# Patient Record
Sex: FEMALE | Race: WHITE | NOT HISPANIC OR LATINO | Employment: FULL TIME | URBAN - METROPOLITAN AREA
[De-identification: names, ages, dates, MRNs, and addresses within clinical notes are randomized per-mention and may not be internally consistent; named-entity substitution may affect disease eponyms.]

---

## 2021-05-20 DIAGNOSIS — O09.522 MULTIGRAVIDA OF ADVANCED MATERNAL AGE IN SECOND TRIMESTER: Primary | ICD-10-CM

## 2021-05-20 DIAGNOSIS — Z36.89 ENCOUNTER FOR FETAL ANATOMIC SURVEY: ICD-10-CM

## 2021-07-21 ENCOUNTER — PROCEDURE VISIT (OUTPATIENT)
Dept: MATERNAL FETAL MEDICINE | Facility: CLINIC | Age: 41
End: 2021-07-21
Payer: OTHER GOVERNMENT

## 2021-07-21 VITALS
DIASTOLIC BLOOD PRESSURE: 79 MMHG | BODY MASS INDEX: 31.55 KG/M2 | HEIGHT: 67 IN | WEIGHT: 201 LBS | SYSTOLIC BLOOD PRESSURE: 130 MMHG

## 2021-07-21 DIAGNOSIS — Z36.89 ENCOUNTER FOR FETAL ANATOMIC SURVEY: ICD-10-CM

## 2021-07-21 DIAGNOSIS — O09.522 MULTIGRAVIDA OF ADVANCED MATERNAL AGE IN SECOND TRIMESTER: ICD-10-CM

## 2021-07-21 DIAGNOSIS — O09.522 MULTIGRAVIDA OF ADVANCED MATERNAL AGE IN SECOND TRIMESTER: Primary | ICD-10-CM

## 2021-07-21 DIAGNOSIS — Z36.89 ENCOUNTER FOR ULTRASOUND TO ASSESS FETAL GROWTH: ICD-10-CM

## 2021-07-21 PROCEDURE — 99204 OFFICE O/P NEW MOD 45 MIN: CPT | Mod: 25,,, | Performed by: OBSTETRICS & GYNECOLOGY

## 2021-07-21 PROCEDURE — 99204 PR OFFICE/OUTPT VISIT, NEW, LEVL IV, 45-59 MIN: ICD-10-PCS | Mod: 25,,, | Performed by: OBSTETRICS & GYNECOLOGY

## 2021-07-21 PROCEDURE — 76811 PR US, OB FETAL EVAL & EXAM, TRANSABDOM,FIRST GESTATION: ICD-10-PCS | Mod: ,,, | Performed by: OBSTETRICS & GYNECOLOGY

## 2021-07-21 PROCEDURE — 76811 OB US DETAILED SNGL FETUS: CPT | Mod: ,,, | Performed by: OBSTETRICS & GYNECOLOGY

## 2021-09-08 ENCOUNTER — PROCEDURE VISIT (OUTPATIENT)
Dept: MATERNAL FETAL MEDICINE | Facility: CLINIC | Age: 41
End: 2021-09-08
Payer: OTHER GOVERNMENT

## 2021-09-08 VITALS
SYSTOLIC BLOOD PRESSURE: 132 MMHG | BODY MASS INDEX: 31.75 KG/M2 | WEIGHT: 202.69 LBS | DIASTOLIC BLOOD PRESSURE: 81 MMHG

## 2021-09-08 DIAGNOSIS — O09.522 MULTIGRAVIDA OF ADVANCED MATERNAL AGE IN SECOND TRIMESTER: ICD-10-CM

## 2021-09-08 DIAGNOSIS — Z36.89 ENCOUNTER FOR ULTRASOUND TO ASSESS FETAL GROWTH: ICD-10-CM

## 2021-09-08 DIAGNOSIS — O35.00X0 FETAL CNS MALFORMATION IN PREGNANCY, SINGLE GESTATION: ICD-10-CM

## 2021-09-08 PROCEDURE — 99213 OFFICE O/P EST LOW 20 MIN: CPT | Mod: 25,,, | Performed by: OBSTETRICS & GYNECOLOGY

## 2021-09-08 PROCEDURE — 76816 OB US FOLLOW-UP PER FETUS: CPT | Mod: 26,,, | Performed by: OBSTETRICS & GYNECOLOGY

## 2021-09-08 PROCEDURE — 76816 PR  US,PREGNANT UTERUS,F/U,TRANSABD APP: ICD-10-PCS | Mod: 26,,, | Performed by: OBSTETRICS & GYNECOLOGY

## 2021-09-08 PROCEDURE — 99213 PR OFFICE/OUTPT VISIT, EST, LEVL III, 20-29 MIN: ICD-10-PCS | Mod: 25,,, | Performed by: OBSTETRICS & GYNECOLOGY

## 2021-09-13 DIAGNOSIS — O35.00X0 FETAL CNS MALFORMATION IN PREGNANCY, SINGLE GESTATION: Primary | ICD-10-CM

## 2021-09-14 ENCOUNTER — TELEPHONE (OUTPATIENT)
Dept: MATERNAL FETAL MEDICINE | Facility: CLINIC | Age: 41
End: 2021-09-14

## 2021-09-16 ENCOUNTER — TELEPHONE (OUTPATIENT)
Dept: MATERNAL FETAL MEDICINE | Facility: CLINIC | Age: 41
End: 2021-09-16

## 2021-09-17 ENCOUNTER — TELEPHONE (OUTPATIENT)
Dept: MATERNAL FETAL MEDICINE | Facility: CLINIC | Age: 41
End: 2021-09-17

## 2021-09-27 ENCOUNTER — HOSPITAL ENCOUNTER (OUTPATIENT)
Dept: RADIOLOGY | Facility: OTHER | Age: 41
Discharge: HOME | End: 2021-09-27
Attending: OBSTETRICS & GYNECOLOGY
Payer: OTHER GOVERNMENT

## 2021-09-27 DIAGNOSIS — O35.00X0 FETAL CNS MALFORMATION IN PREGNANCY, SINGLE GESTATION: ICD-10-CM

## 2021-10-20 ENCOUNTER — OFFICE VISIT (OUTPATIENT)
Dept: MATERNAL FETAL MEDICINE | Facility: CLINIC | Age: 41
End: 2021-10-20
Payer: OTHER GOVERNMENT

## 2021-10-20 VITALS — WEIGHT: 211 LBS | SYSTOLIC BLOOD PRESSURE: 135 MMHG | BODY MASS INDEX: 33.05 KG/M2 | DIASTOLIC BLOOD PRESSURE: 81 MMHG

## 2021-10-20 DIAGNOSIS — O09.522 MULTIGRAVIDA OF ADVANCED MATERNAL AGE IN SECOND TRIMESTER: ICD-10-CM

## 2021-10-20 DIAGNOSIS — Z36.89 ENCOUNTER FOR ULTRASOUND TO ASSESS FETAL GROWTH: ICD-10-CM

## 2021-10-20 PROCEDURE — 99213 OFFICE O/P EST LOW 20 MIN: CPT | Mod: 25,,, | Performed by: OBSTETRICS & GYNECOLOGY

## 2021-10-20 PROCEDURE — 76816 OB US FOLLOW-UP PER FETUS: CPT | Mod: ,,, | Performed by: OBSTETRICS & GYNECOLOGY

## 2021-10-20 PROCEDURE — 99213 PR OFFICE/OUTPT VISIT, EST, LEVL III, 20-29 MIN: ICD-10-PCS | Mod: 25,,, | Performed by: OBSTETRICS & GYNECOLOGY

## 2021-10-20 PROCEDURE — 76816 PR  US,PREGNANT UTERUS,F/U,TRANSABD APP: ICD-10-PCS | Mod: ,,, | Performed by: OBSTETRICS & GYNECOLOGY

## 2021-11-17 ENCOUNTER — OFFICE VISIT (OUTPATIENT)
Dept: MATERNAL FETAL MEDICINE | Facility: CLINIC | Age: 41
End: 2021-11-17
Payer: OTHER GOVERNMENT

## 2021-11-17 VITALS
DIASTOLIC BLOOD PRESSURE: 88 MMHG | WEIGHT: 214.63 LBS | SYSTOLIC BLOOD PRESSURE: 141 MMHG | BODY MASS INDEX: 33.61 KG/M2

## 2021-11-17 DIAGNOSIS — Z36.89 ENCOUNTER FOR ULTRASOUND TO ASSESS FETAL GROWTH: ICD-10-CM

## 2021-11-17 PROCEDURE — 76819 PR US, OB, FETAL BIOPHYSICAL, W/O NST: ICD-10-PCS | Mod: S$GLB,,, | Performed by: OBSTETRICS & GYNECOLOGY

## 2021-11-17 PROCEDURE — 99499 NO LOS: ICD-10-PCS | Mod: S$GLB,,, | Performed by: OBSTETRICS & GYNECOLOGY

## 2021-11-17 PROCEDURE — 76816 PR  US,PREGNANT UTERUS,F/U,TRANSABD APP: ICD-10-PCS | Mod: S$GLB,,, | Performed by: OBSTETRICS & GYNECOLOGY

## 2021-11-17 PROCEDURE — 76816 OB US FOLLOW-UP PER FETUS: CPT | Mod: S$GLB,,, | Performed by: OBSTETRICS & GYNECOLOGY

## 2021-11-17 PROCEDURE — 99499 UNLISTED E&M SERVICE: CPT | Mod: S$GLB,,, | Performed by: OBSTETRICS & GYNECOLOGY

## 2021-11-17 PROCEDURE — 76819 FETAL BIOPHYS PROFIL W/O NST: CPT | Mod: S$GLB,,, | Performed by: OBSTETRICS & GYNECOLOGY

## 2022-09-08 DIAGNOSIS — Z82.79 FAMILY HISTORY OF CONGENITAL OR GENETIC CONDITION: Primary | ICD-10-CM

## 2022-09-09 ENCOUNTER — LAB VISIT (OUTPATIENT)
Dept: LAB | Facility: HOSPITAL | Age: 42
End: 2022-09-09
Attending: MEDICAL GENETICS
Payer: OTHER GOVERNMENT

## 2022-09-09 DIAGNOSIS — Z82.79 FAMILY HISTORY OF CONGENITAL OR GENETIC CONDITION: ICD-10-CM

## 2022-09-09 PROCEDURE — 36415 COLL VENOUS BLD VENIPUNCTURE: CPT | Performed by: MEDICAL GENETICS

## 2022-11-02 LAB
GENETIC COUNSELING?: YES
GENSO SPECIMEN TYPE: NORMAL
MISCELLANEOUS GENETIC TEST NAME: NORMAL
PARTENTAL OR SIBLING TESTING?: NO
REFERENCE LAB: NORMAL
TEST RESULT: NORMAL

## 2022-12-27 DIAGNOSIS — Z82.79 FAMILY HISTORY OF CONGENITAL OR GENETIC CONDITION: Primary | ICD-10-CM

## 2023-01-25 ENCOUNTER — OFFICE VISIT (OUTPATIENT)
Dept: PSYCHIATRY | Facility: CLINIC | Age: 43
End: 2023-01-25
Payer: OTHER GOVERNMENT

## 2023-01-25 DIAGNOSIS — F43.22 ADJUSTMENT DISORDER WITH ANXIETY: Primary | ICD-10-CM

## 2023-01-25 PROCEDURE — 90837 PR PSYCHOTHERAPY W/PATIENT, 60 MIN: ICD-10-PCS | Mod: S$GLB,,, | Performed by: STUDENT IN AN ORGANIZED HEALTH CARE EDUCATION/TRAINING PROGRAM

## 2023-01-25 PROCEDURE — 90837 PSYTX W PT 60 MINUTES: CPT | Mod: S$GLB,,, | Performed by: STUDENT IN AN ORGANIZED HEALTH CARE EDUCATION/TRAINING PROGRAM

## 2023-02-03 ENCOUNTER — TELEPHONE (OUTPATIENT)
Dept: PSYCHIATRY | Facility: CLINIC | Age: 43
End: 2023-02-03
Payer: OTHER GOVERNMENT

## 2023-02-10 ENCOUNTER — OFFICE VISIT (OUTPATIENT)
Dept: INTERNAL MEDICINE | Facility: CLINIC | Age: 43
End: 2023-02-10
Payer: OTHER GOVERNMENT

## 2023-02-10 ENCOUNTER — PATIENT MESSAGE (OUTPATIENT)
Dept: INTERNAL MEDICINE | Facility: CLINIC | Age: 43
End: 2023-02-10

## 2023-02-10 VITALS
SYSTOLIC BLOOD PRESSURE: 126 MMHG | HEART RATE: 60 BPM | OXYGEN SATURATION: 100 % | HEIGHT: 67 IN | DIASTOLIC BLOOD PRESSURE: 88 MMHG | WEIGHT: 220.88 LBS | BODY MASS INDEX: 34.67 KG/M2

## 2023-02-10 DIAGNOSIS — M72.2 PLANTAR FASCIITIS, BILATERAL: ICD-10-CM

## 2023-02-10 DIAGNOSIS — K21.9 GASTROESOPHAGEAL REFLUX DISEASE, UNSPECIFIED WHETHER ESOPHAGITIS PRESENT: ICD-10-CM

## 2023-02-10 DIAGNOSIS — R53.83 FATIGUE, UNSPECIFIED TYPE: ICD-10-CM

## 2023-02-10 DIAGNOSIS — Z11.59 ENCOUNTER FOR HEPATITIS C SCREENING TEST FOR LOW RISK PATIENT: ICD-10-CM

## 2023-02-10 DIAGNOSIS — Z11.4 SCREENING FOR HIV (HUMAN IMMUNODEFICIENCY VIRUS): ICD-10-CM

## 2023-02-10 DIAGNOSIS — Z00.00 HEALTH MAINTENANCE EXAMINATION: Primary | ICD-10-CM

## 2023-02-10 DIAGNOSIS — Z13.1 SCREENING FOR DIABETES MELLITUS: ICD-10-CM

## 2023-02-10 DIAGNOSIS — Z13.220 ENCOUNTER FOR SCREENING FOR LIPID DISORDER: ICD-10-CM

## 2023-02-10 DIAGNOSIS — F41.9 ANXIETY: ICD-10-CM

## 2023-02-10 PROCEDURE — 99203 OFFICE O/P NEW LOW 30 MIN: CPT | Mod: 25,S$GLB,, | Performed by: INTERNAL MEDICINE

## 2023-02-10 PROCEDURE — 99203 PR OFFICE/OUTPT VISIT, NEW, LEVL III, 30-44 MIN: ICD-10-PCS | Mod: 25,S$GLB,, | Performed by: INTERNAL MEDICINE

## 2023-02-10 PROCEDURE — 90715 TDAP VACCINE 7 YRS/> IM: CPT | Mod: S$GLB,,, | Performed by: INTERNAL MEDICINE

## 2023-02-10 PROCEDURE — 90471 FLU VACCINE (QUAD) GREATER THAN OR EQUAL TO 3YO PRESERVATIVE FREE IM: ICD-10-PCS | Mod: S$GLB,,, | Performed by: INTERNAL MEDICINE

## 2023-02-10 PROCEDURE — 99999 PR PBB SHADOW E&M-EST. PATIENT-LVL V: ICD-10-PCS | Mod: PBBFAC,,, | Performed by: INTERNAL MEDICINE

## 2023-02-10 PROCEDURE — 90472 IMMUNIZATION ADMIN EACH ADD: CPT | Mod: S$GLB,,, | Performed by: INTERNAL MEDICINE

## 2023-02-10 PROCEDURE — 90715 TDAP VACCINE GREATER THAN OR EQUAL TO 7YO IM: ICD-10-PCS | Mod: S$GLB,,, | Performed by: INTERNAL MEDICINE

## 2023-02-10 PROCEDURE — 90686 FLU VACCINE (QUAD) GREATER THAN OR EQUAL TO 3YO PRESERVATIVE FREE IM: ICD-10-PCS | Mod: S$GLB,,, | Performed by: INTERNAL MEDICINE

## 2023-02-10 PROCEDURE — 90686 IIV4 VACC NO PRSV 0.5 ML IM: CPT | Mod: S$GLB,,, | Performed by: INTERNAL MEDICINE

## 2023-02-10 PROCEDURE — 90471 IMMUNIZATION ADMIN: CPT | Mod: S$GLB,,, | Performed by: INTERNAL MEDICINE

## 2023-02-10 PROCEDURE — 90472 TDAP VACCINE GREATER THAN OR EQUAL TO 7YO IM: ICD-10-PCS | Mod: S$GLB,,, | Performed by: INTERNAL MEDICINE

## 2023-02-10 PROCEDURE — 99999 PR PBB SHADOW E&M-EST. PATIENT-LVL V: CPT | Mod: PBBFAC,,, | Performed by: INTERNAL MEDICINE

## 2023-02-10 RX ORDER — HYDROXYZINE HYDROCHLORIDE 25 MG/1
25 TABLET, FILM COATED ORAL 3 TIMES DAILY PRN
Qty: 90 TABLET | Refills: 2 | Status: SHIPPED | OUTPATIENT
Start: 2023-02-10 | End: 2023-05-11

## 2023-02-10 NOTE — ASSESSMENT & PLAN NOTE
F/b Dr. Baker in psychology.  Anxiety worsened night & preventing her from sleeping well.    -cont following closely w/ Dr. Baker  -trial of hydroxyzine   -may consider buspirone  -may consider quetiapine or mirtazapine p.r.n. insomnia

## 2023-02-10 NOTE — ASSESSMENT & PLAN NOTE
Worsening Sxs, now wakes her up from sleep; FHx of esophageal CA 2/2 Brar esophagus 2/2 unTx GERD.    -referral to GI for possible endoscopy  -cont OTC omeprazole b.i.d.   -may consider pantoprazole or famotidine

## 2023-02-10 NOTE — PATIENT INSTRUCTIONS
-take Atarax as needed for anxiety; also take Melatonin as needed for sleep  -please schedule appointments with Gastroenterology for reflux, and Podiatry for foot pain  -please get labs drawn today; I will follow-up with you with your results  -please get your COVID booster at your convenience  -please go to the ED if you experience any of the following: fever, chills, chest pain, difficulty breathing, abdominal pain, nausea, vomiting, diarrhea, debilitating pain, or worsening symptoms.  -please call your PCP for any other concerns. If your PCP is unavailable, please go to the ED.

## 2023-02-10 NOTE — ASSESSMENT & PLAN NOTE
Recurrent plantar fasciitis previously Tx'd well w/ insoles & cortisone injections by Podiatry.    -referral to Podiatry for cortisone injections

## 2023-02-10 NOTE — PROGRESS NOTES
[facility]  Clinic Note  2/10/2023 1:31 PM  Patient ID: Kaity Mancuso 42 y.o. female  : 1980  MRN: [id number]  Primary Care Provider: Ernesto Vazquez MD    Presenting History:     Chief Complaint   Patient presents with    Establish Care    Foot Pain     History of Presenting Illness:   Ms. Kaity Mancuso is a 42 y.o. female w/ no significant PMH; who Px to clinic to/for establish care; BL foot pain, GERD, anxiety.    Pt generally feels well today.  Her main concerns to discuss today are BL foot pain, worsening reflux, & worsening anxiety/insomnia.    Of note she is  w/ her most recent birth in 2021.  During previous pregnancies, & immediate postpartum period, she developed BL plantar fasciitis, reso'd completely w/ insoles & cortisone injections per Podiatry.  Her current foot pain is described as almost exactly the same as previous plantar fasciitis episodes.  She has tried insoles w/ moderate relief, however feels that her L foot is moderately worse than her R foot.  Denies motor/sensory loss, difficulty ambulating, pain that wakes her up from sleep.  Also denies fevers/chills, skin changes, or trauma.    She also has recurrent GERD that she controls w/ OTC omeprazole & Ca carbonate, usually worsened w/ pregnancy & immediate postpartum period, & improves spontaneously; however she is noticed that her Sxs have worsened in the last 3 wks 2/2 stressors & she is now taking OTC omeprazole more frequently.  She also reports that her Sxs are worse at night & are now waking her from sleep.  Denies change in diet or consumption of spicy/overly season food.  Denies AP or N/V/C/D.    She also reports worsening anxiety & insomnia in the s/o her daughter, 13-mo-old, currently trached & on a ventilator w/ poor prognosis.  She follows w/ Dr. Baker in psychology, who contacted me prior to this visit on pt's req so I am aware of the stressors in her life.  She has a good support system  w/ her  & 2 other children.  She is never been on psychiatric Rx before & does not take anything OTC for anxiety or sleep currently.  She is interested in trying something to help her out, however is very insightful about addictive or dependent effects & would like to avoid those situations.    Denies F/C, CP/SOB/cough, AP/N/V/C/D, dysuria/hematuria, melena/hematochezia, weakness/numbness/tingling, HA/presyncope/syncope.    Screenings: DM, HIV, and Hep C TBC today; MMG & cervical CA TBC by OBGYN later. Vax: COVID TBC later; Flu and TDaP TBC today.    PMH, PSH, Rx, allergies, FHx, SHx all reviewed & updated.       Review of Systems:   Review of Systems   Constitutional:  Negative for chills, fever and unexpected weight change.   HENT:  Negative for sinus pain, trouble swallowing and voice change.    Eyes:  Negative for photophobia, pain and visual disturbance.   Respiratory:  Negative for cough, shortness of breath and wheezing.    Cardiovascular:  Negative for chest pain, palpitations and leg swelling.   Gastrointestinal:  Negative for abdominal pain, blood in stool, constipation, diarrhea, nausea and vomiting.        Burning sensation in throat, no dysphagia, worse at night, a/w mild dry cough   Endocrine: Negative for polydipsia and polyuria.   Genitourinary:  Negative for dysuria, frequency, hematuria and urgency.   Musculoskeletal:  Positive for arthralgias and myalgias. Negative for back pain.        BL foot pain, worse at heels, aggravated by walking & pressure, alleviated by rest   Skin:  Negative for color change and pallor.   Neurological:  Negative for syncope, weakness, numbness and headaches.   Hematological:  Does not bruise/bleed easily.   Psychiatric/Behavioral:  Positive for sleep disturbance. Negative for confusion and dysphoric mood. The patient is nervous/anxious.      Past History:     Past Medical History:   Diagnosis Date    AMA (advanced maternal age) multigravida 35+      Past Surgical  "History:   Procedure Laterality Date    INGUINAL HERNIA REPAIR Bilateral 1982     Family History    None       Social History     Socioeconomic History    Marital status:    Tobacco Use    Smoking status: Never    Smokeless tobacco: Never   Substance and Sexual Activity    Alcohol use: Not Currently    Drug use: Never    Sexual activity: Yes     Review of patient's allergies indicates:   Allergen Reactions    Quinoa Itching, Rash and Swelling    Ciprofloxacin Itching     Other reaction(s): Rash, Vomiting    Penicillins Itching     Other reaction(s): Rash     Current Outpatient Medications on File Prior to Visit   Medication Sig    [DISCONTINUED] prenatal 25/iron fum/folic/dha (PRENATAL-1 ORAL) Take 1 tablet by mouth.     No current facility-administered medications on file prior to visit.        Objective:     Vital Signs:   Vitals:    02/10/23 1340   BP: 126/88   Pulse: 60   SpO2: 100%   Weight: 100.2 kg (220 lb 14.4 oz)   Height: 5' 7" (1.702 m)     Body mass index is 34.6 kg/m².    Physical Exam  Vitals and nursing note reviewed.   Constitutional:       General: She is not in acute distress.     Appearance: She is obese. She is not ill-appearing.   HENT:      Head: Normocephalic and atraumatic.      Right Ear: External ear normal.      Left Ear: External ear normal.      Mouth/Throat:      Mouth: Mucous membranes are moist.      Pharynx: Oropharynx is clear. No oropharyngeal exudate.   Eyes:      General: No scleral icterus.     Conjunctiva/sclera: Conjunctivae normal.      Pupils: Pupils are equal, round, and reactive to light.   Neck:      Vascular: No JVD.   Cardiovascular:      Rate and Rhythm: Normal rate and regular rhythm.      Pulses: Normal pulses.      Heart sounds: Normal heart sounds. No murmur heard.    No friction rub.   Pulmonary:      Effort: Pulmonary effort is normal. No respiratory distress.      Breath sounds: Normal breath sounds. No wheezing or rales.   Abdominal:      General: " Abdomen is flat. Bowel sounds are normal. There is no distension.      Palpations: Abdomen is soft. There is no mass.      Tenderness: There is no abdominal tenderness. There is no rebound.   Musculoskeletal:         General: No tenderness or signs of injury. Normal range of motion.      Cervical back: Normal range of motion and neck supple.      Right lower leg: No edema.      Left lower leg: No edema.   Skin:     General: Skin is warm and dry.      Coloration: Skin is not jaundiced or pale.      Findings: No erythema.   Neurological:      General: No focal deficit present.      Mental Status: She is alert and oriented to person, place, and time.   Psychiatric:         Mood and Affect: Mood and affect normal.         Judgment: Judgment normal.     Laboratory:   All pertinent labs within the past 24 hours and/or relevant to this visit have been reviewed.    No results found for this or any previous visit (from the past 24 hour(s)).    Diagnostic Studies:     All pertinent imaging/diagnostic studies within the past 24 hours and/or relevant to this visit have been reviewed.    Assessment/Plan:     1. Health maintenance examination    2. Fatigue, unspecified type    3. Screening for diabetes mellitus    4. Encounter for screening for lipid disorder    5. Screening for HIV (human immunodeficiency virus)    6. Encounter for hepatitis C screening test for low risk patient    7. Plantar fasciitis, bilateral    8. Gastroesophageal reflux disease, unspecified whether esophagitis present    9. Anxiety        1. Health maintenance examination  -     Comprehensive Metabolic Panel; Future; Expected date: 02/10/2023  -     Hemoglobin A1C; Future; Expected date: 02/10/2023  -     Lipid Panel; Future; Expected date: 02/10/2023  -     TSH; Future; Expected date: 02/10/2023    2. Fatigue, unspecified type  -     CBC Auto Differential; Future; Expected date: 02/10/2023    3. Screening for diabetes mellitus  -     Hemoglobin A1C;  Future; Expected date: 02/10/2023    4. Encounter for screening for lipid disorder  -     Lipid Panel; Future; Expected date: 02/10/2023    5. Screening for HIV (human immunodeficiency virus)  -     HIV 1/2 Ag/Ab (4th Gen); Future; Expected date: 02/10/2023    6. Encounter for hepatitis C screening test for low risk patient  -     Hepatitis C Antibody; Future; Expected date: 02/10/2023    7. Plantar fasciitis, bilateral  Assessment & Plan:  Recurrent plantar fasciitis previously Tx'd well w/ insoles & cortisone injections by Podiatry.    -referral to Podiatry for cortisone injections    Orders:  -     Ambulatory referral/consult to Podiatry; Future; Expected date: 02/17/2023    8. Gastroesophageal reflux disease, unspecified whether esophagitis present  Assessment & Plan:  Worsening Sxs, now wakes her up from sleep; FHx of esophageal CA 2/2 Brar esophagus 2/2 unTx GERD.    -referral to GI for possible endoscopy  -cont OTC omeprazole b.i.d.   -may consider pantoprazole or famotidine    Orders:  -     Ambulatory referral/consult to Gastroenterology; Future; Expected date: 02/17/2023    9. Anxiety  Assessment & Plan:  F/b Dr. Baker in psychology.  Anxiety worsened night & preventing her from sleeping well.    -cont following closely w/ Dr. Baker  -trial of hydroxyzine   -may consider buspirone  -may consider quetiapine or mirtazapine p.r.n. insomnia    Orders:  -     hydrOXYzine HCL (ATARAX) 25 MG tablet; Take 1 tablet (25 mg total) by mouth 3 (three) times daily as needed for Anxiety.  Dispense: 90 tablet; Refill: 2        Follow up in about 6 weeks (around 3/24/2023).    Orders Placed This Encounter   Procedures    CBC Auto Differential    Comprehensive Metabolic Panel    Hemoglobin A1C    Lipid Panel    TSH    HIV 1/2 Ag/Ab (4th Gen)    Hepatitis C Antibody    Ambulatory referral/consult to Gastroenterology    Ambulatory referral/consult to Podiatry       Discussed with Dr. Maria - staff attestation to  follow      Ernesto Vazquez M.D.  Internal Medicine, PGY-1  [facility]

## 2023-02-15 ENCOUNTER — OFFICE VISIT (OUTPATIENT)
Dept: PSYCHIATRY | Facility: CLINIC | Age: 43
End: 2023-02-15
Payer: OTHER GOVERNMENT

## 2023-02-15 ENCOUNTER — PATIENT MESSAGE (OUTPATIENT)
Dept: INTERNAL MEDICINE | Facility: CLINIC | Age: 43
End: 2023-02-15
Payer: OTHER GOVERNMENT

## 2023-02-15 DIAGNOSIS — F43.22 ADJUSTMENT DISORDER WITH ANXIETY: Primary | ICD-10-CM

## 2023-02-15 PROCEDURE — 99999 PR PBB SHADOW E&M-EST. PATIENT-LVL I: ICD-10-PCS | Mod: PBBFAC,,, | Performed by: STUDENT IN AN ORGANIZED HEALTH CARE EDUCATION/TRAINING PROGRAM

## 2023-02-15 PROCEDURE — 99999 PR PBB SHADOW E&M-EST. PATIENT-LVL I: CPT | Mod: PBBFAC,,, | Performed by: STUDENT IN AN ORGANIZED HEALTH CARE EDUCATION/TRAINING PROGRAM

## 2023-02-15 PROCEDURE — 90834 PSYTX W PT 45 MINUTES: CPT | Mod: S$GLB,,, | Performed by: STUDENT IN AN ORGANIZED HEALTH CARE EDUCATION/TRAINING PROGRAM

## 2023-02-15 PROCEDURE — 90834 PR PSYCHOTHERAPY W/PATIENT, 45 MIN: ICD-10-PCS | Mod: S$GLB,,, | Performed by: STUDENT IN AN ORGANIZED HEALTH CARE EDUCATION/TRAINING PROGRAM

## 2023-02-15 NOTE — PROGRESS NOTES
Individual Psychotherapy  Date: 1/25/2023  Site: [facility]       Session Number: 1  Present in Session: Patient  Therapeutic Intervention: Cognitive Behavioral Therapy; Outpatient - Behavior modifying psychotherapy 60 min - CPT code 69688  Chief complaint/reason for encounter: Anxiety, stress     Interval history and content of current session: Patient initially seen as part of a family support visit through the Psychology Consult-Liaison Service. Patient's 13-month-old daughter is currently in the PICU due to a recent aspiration event. Daughter also has a diagnosis of Gabriela's Syndrome. Patient described anxiety and sadness directly linked to stress of daughter's hospitalization. No history of mental health or substance use problems. No current substance use concerns. Patient noted that she occasionally has 1-2 glasses of wine in the evening. No recent increase in alcohol use. Patient described having good social support and highlighted relationship with  (Evan). Patient and  are staying at [facility] during their daughter's prolonged hospitalization. We reviewed strategies for communication with the medical team. Also reviewed self-care strategies - physical activity, nutrition, hydration, sleep. Introduced diaphragmatic breathing. Validated and normalized distress. Although stress is extremely high, patient appears to be coping well.     Mental Status Exam & Behavioral Observations  Appearance:  Good grooming and hygiene. Dressed in liset-shirt and athletic pants. Appeared stated age.      Orientation: Oriented x 4.   Speech: Normal rate, volume, and prosody..    Thought Processes: Linear and goal-directed.      Thought Content: Normal. No suicidal or homicidal ideation. No indications of obsessions or delusions.   Mood: Dysphoric  Anxious.   Affect: Full range, congruent with mood and session content..   Attention & Concentration: Intact. No deficits noted.   Insight: Excellent    Judgment: Excellent.   Behavioral Observations: Cooperative and engaged. Seated in chair. Good eye contact. No signs of psychomotor agitation or retardation.     Treatment plan:  Target symptoms: Anxiety  Why chosen therapy is appropriate versus another modality: relevant to diagnosis, patient responds to this modality, evidence based practice  Outcome monitoring methods: self-report, checklist/rating scale  Therapeutic intervention type: behavior modifying psychotherapy, supportive psychotherapy    Risk parameters:  Patient reports no suicidal ideation  Patient reports no homicidal ideation  Patient reports no self-injurious behavior  Patient reports no violent behavior    Verbal deficits: None    Patient's response to intervention:  The patient's response to intervention is accepting.    Progress toward goals and other mental status changes:  The patient's progress toward goals is good.    Diagnosis:     ICD-10-CM ICD-9-CM   1. Adjustment disorder with anxiety  F43.22 309.24       Plan: Individual psychotherapy; CBT sessions as needed.    Return to clinic: as needed    Length of Service: 65 minutes

## 2023-02-17 ENCOUNTER — OFFICE VISIT (OUTPATIENT)
Dept: PODIATRY | Facility: CLINIC | Age: 43
End: 2023-02-17
Payer: OTHER GOVERNMENT

## 2023-02-17 VITALS
DIASTOLIC BLOOD PRESSURE: 89 MMHG | HEART RATE: 65 BPM | BODY MASS INDEX: 35.43 KG/M2 | SYSTOLIC BLOOD PRESSURE: 130 MMHG | WEIGHT: 225.75 LBS | HEIGHT: 67 IN

## 2023-02-17 DIAGNOSIS — M79.672 PAIN OF LEFT HEEL: Primary | ICD-10-CM

## 2023-02-17 DIAGNOSIS — M72.2 PLANTAR FASCIITIS, BILATERAL: ICD-10-CM

## 2023-02-17 PROCEDURE — 99203 PR OFFICE/OUTPT VISIT, NEW, LEVL III, 30-44 MIN: ICD-10-PCS | Mod: 25,S$GLB,, | Performed by: PODIATRIST

## 2023-02-17 PROCEDURE — 20550 NJX 1 TENDON SHEATH/LIGAMENT: CPT | Mod: LT,S$GLB,, | Performed by: PODIATRIST

## 2023-02-17 PROCEDURE — 99999 PR PBB SHADOW E&M-EST. PATIENT-LVL III: ICD-10-PCS | Mod: PBBFAC,,, | Performed by: PODIATRIST

## 2023-02-17 PROCEDURE — 99203 OFFICE O/P NEW LOW 30 MIN: CPT | Mod: 25,S$GLB,, | Performed by: PODIATRIST

## 2023-02-17 PROCEDURE — 99999 PR PBB SHADOW E&M-EST. PATIENT-LVL III: CPT | Mod: PBBFAC,,, | Performed by: PODIATRIST

## 2023-02-17 PROCEDURE — 20550 PR INJECT TENDON SHEATH/LIGAMENT: ICD-10-PCS | Mod: LT,S$GLB,, | Performed by: PODIATRIST

## 2023-02-17 RX ORDER — TRIAMCINOLONE ACETONIDE 40 MG/ML
40 INJECTION, SUSPENSION INTRA-ARTICULAR; INTRAMUSCULAR ONCE
Status: COMPLETED | OUTPATIENT
Start: 2023-02-17 | End: 2023-02-17

## 2023-02-17 RX ORDER — DEXAMETHASONE SODIUM PHOSPHATE 4 MG/ML
4 INJECTION, SOLUTION INTRA-ARTICULAR; INTRALESIONAL; INTRAMUSCULAR; INTRAVENOUS; SOFT TISSUE ONCE
Status: COMPLETED | OUTPATIENT
Start: 2023-02-17 | End: 2023-02-17

## 2023-02-17 RX ADMIN — DEXAMETHASONE SODIUM PHOSPHATE 4 MG: 4 INJECTION, SOLUTION INTRA-ARTICULAR; INTRALESIONAL; INTRAMUSCULAR; INTRAVENOUS; SOFT TISSUE at 11:02

## 2023-02-17 RX ADMIN — TRIAMCINOLONE ACETONIDE 40 MG: 40 INJECTION, SUSPENSION INTRA-ARTICULAR; INTRAMUSCULAR at 11:02

## 2023-02-17 NOTE — PROGRESS NOTES
Chief Complaint   Patient presents with    Plantar Fasciitis     Bilateral foot pain. Originally in 2014 when she had her first child. In 2021, it started again.              MEDICAL DECISION MAKING           Problem List Items Addressed This Visit       Plantar fasciitis, bilateral     Other Visit Diagnoses       Pain of left heel    -  Primary              I counseled the patient on the patient's conditions, their implications and medical management.     The patient was provided  literature regarding plantar fasciitis and stretching.   Spenco orthotic supports were recommended.   Avoid wearing flats, slippers, sandals, and going barefoot.      Patient is interested in a cortisone injection today.  With the patient's permission, an injection of total of 2ccs of mixture of 0.5cc of Kenalog-40, 0.5cc of dexamethasone phosphate 4mg and 1 cc of plain lidocaine 1% into the left  heel, medial approach.  Patient tolerated well.     Call or return to clinic prn if these symptoms worsen or fail to improve as anticipated.                               HPI:       Kaity Mancuso is a 42 y.o. female who presents to clinic with concerns of bilateral (left > right ) heel pain for almost a year.     Pain is worse with weight bearing and first few steps after prolonged periods of rest.     Patient denies acute trauma to the affected area.   Treatment tried: stretching and insoles (Dr. Valdes).  Has received cortisone injections in the past with relief.         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 90 tablet 2     No  "current facility-administered medications on file prior to visit.           Review of patient's allergies indicates:   Allergen Reactions    Quinoa Itching, Rash and Swelling    Ciprofloxacin Itching     Other reaction(s): Rash, Vomiting    Penicillins Itching     Other reaction(s): Rash             ROS:  General ROS: negative for  chills, fatigue or fever  Cardiovascular ROS: no chest pain or dyspnea on exertion  Musculoskeletal ROS: negative for joint pain or joint stiffness.  Negative for loss of strength.  Positive for foot pain.   Neuro ROS: Negative for syncope, numbness, or muscle weakness  Skin ROS: Negative for rash, itching or nail/hair changes.           OBJECTIVE:         Vitals:    02/17/23 1103   BP: 130/89   Pulse: 65   Weight: 102.4 kg (225 lb 12 oz)   Height: 5' 7" (1.702 m)        Bilateral Lower extremity exam:  Vasc:   Palpable pedal pulses.   Feet appropriately warm to touch.   Cap refill time is within normal limits   Edema: minimal    Neurological:    Light touch, proprioception, and Sharp/dull sensation are all intact.   There is no Tinel's along the tarsal tunnel.    Mulders click:   absent  Reflexes:   2+    Derm:   No open lesions, macerations, or rashes  Bruising:  absent  Redness:  absent  Pedal hair:  present      MSK:    Palpable pain plantar medial tubercle of the calcaneus bilateral,   tightness to the Achilles tendon with ROM bilateral   There is no pain with the lateral heel squeeze/compression  test.   "

## 2023-02-22 ENCOUNTER — OFFICE VISIT (OUTPATIENT)
Dept: PSYCHIATRY | Facility: CLINIC | Age: 43
End: 2023-02-22
Payer: OTHER GOVERNMENT

## 2023-02-22 DIAGNOSIS — F43.22 ADJUSTMENT DISORDER WITH ANXIETY: Primary | ICD-10-CM

## 2023-02-22 PROCEDURE — 90837 PSYTX W PT 60 MINUTES: CPT | Mod: S$GLB,,, | Performed by: STUDENT IN AN ORGANIZED HEALTH CARE EDUCATION/TRAINING PROGRAM

## 2023-02-22 PROCEDURE — 99999 PR PBB SHADOW E&M-EST. PATIENT-LVL I: ICD-10-PCS | Mod: PBBFAC,,, | Performed by: STUDENT IN AN ORGANIZED HEALTH CARE EDUCATION/TRAINING PROGRAM

## 2023-02-22 PROCEDURE — 90837 PR PSYCHOTHERAPY W/PATIENT, 60 MIN: ICD-10-PCS | Mod: S$GLB,,, | Performed by: STUDENT IN AN ORGANIZED HEALTH CARE EDUCATION/TRAINING PROGRAM

## 2023-02-22 PROCEDURE — 99999 PR PBB SHADOW E&M-EST. PATIENT-LVL I: CPT | Mod: PBBFAC,,, | Performed by: STUDENT IN AN ORGANIZED HEALTH CARE EDUCATION/TRAINING PROGRAM

## 2023-02-24 PROBLEM — F43.22 ADJUSTMENT DISORDER WITH ANXIETY: Status: ACTIVE | Noted: 2023-02-10

## 2023-02-28 ENCOUNTER — TELEPHONE (OUTPATIENT)
Dept: GASTROENTEROLOGY | Facility: CLINIC | Age: 43
End: 2023-02-28

## 2023-02-28 ENCOUNTER — OFFICE VISIT (OUTPATIENT)
Dept: GASTROENTEROLOGY | Facility: CLINIC | Age: 43
End: 2023-02-28
Payer: OTHER GOVERNMENT

## 2023-02-28 DIAGNOSIS — K21.9 GASTROESOPHAGEAL REFLUX DISEASE, UNSPECIFIED WHETHER ESOPHAGITIS PRESENT: Primary | ICD-10-CM

## 2023-02-28 PROCEDURE — 99204 PR OFFICE/OUTPT VISIT, NEW, LEVL IV, 45-59 MIN: ICD-10-PCS | Mod: 95,,, | Performed by: INTERNAL MEDICINE

## 2023-02-28 PROCEDURE — 99204 OFFICE O/P NEW MOD 45 MIN: CPT | Mod: 95,,, | Performed by: INTERNAL MEDICINE

## 2023-02-28 RX ORDER — OMEPRAZOLE 40 MG/1
40 CAPSULE, DELAYED RELEASE ORAL DAILY
Qty: 90 CAPSULE | Refills: 0 | Status: SHIPPED | OUTPATIENT
Start: 2023-02-28 | End: 2023-06-05

## 2023-02-28 NOTE — PROGRESS NOTES
The patient location is: Home  The chief complaint leading to consultation is: GERD    Visit type: audiovisual    Face to Face time with patient: 30 minutes of total time spent on the encounter, which includes face to face time and non-face to face time preparing to see the patient (eg, review of tests), Obtaining and/or reviewing separately obtained history, Documenting clinical information in the electronic or other health record, Independently interpreting results (not separately reported) and communicating results to the patient/family/caregiver, or Care coordination (not separately reported).         Each patient to whom he or she provides medical services by telemedicine is:  (1) informed of the relationship between the physician and patient and the respective role of any other health care provider with respect to management of the patient; and (2) notified that he or she may decline to receive medical services by telemedicine and may withdraw from such care at any time.    Notes:  is a 42 year old lady with GERD. Medical history includes Anxiety, Plantar fasciitis and GERD. Currently under lots of stress with her 13-month-old daughter (diagnosed with Gabriela's syndrome) in the PICU due to a recent aspiration event. Her reflux symptoms were controlled with OTC omeprazole & TUMs in the past. Takes Pepcid AC twice a day currently. Her symptoms used to worsen with pregnancy and would get better immediately postpartum. Over the past year she has noted worsening symptoms with stressors & she is now having  more frequent nocturnal symptoms. Denies change in diet or consumption of spicy/overly season food.  No abdominal pains, changes in bowel pattern, blood/ mucus in stool or unintentional weight loss. No melena or maroon stools. No recent changes in diet or medications. No family history of IBD, Celiac disease or GI malignancy. No regular NSAIDs, alcohol or tobacco use. No recent antibiotic use, travels or  sick contacts. No prior history of C.diff.    Past medical, surgical, social and family history reviewed in epic    Medication allergies reviewed in epic    Review of systems:    Constitutional:  No fever, no chills, no weight loss, appetite is normal  Eyes:  No visual changes or red eyes  ENT:  No odynophagia or hoarseness of voice  Cardiovascular:  No angina or palpitation  Respiratory:  No shortness breath or wheezing  Genitourinary:  No dysuria or frequency  Musculoskeletal:  No myalgias or arthralgias  Skin:  No pruritus or eczema  Neurologic:  No headache or seizures  Psychiatric:  No anxiety depression  Gastrointestinal:  See HPI    Physical exam:  Virtual visit    Recent lab, endoscopy and imaging reports reviewed    Impression: GERD with nocturnal symptoms    Recommendations:     Trial of Omeprazole 40 mg daily, 30 minutes before supper  Schedule EGD

## 2023-03-01 ENCOUNTER — OFFICE VISIT (OUTPATIENT)
Dept: PSYCHIATRY | Facility: CLINIC | Age: 43
End: 2023-03-01
Payer: OTHER GOVERNMENT

## 2023-03-01 DIAGNOSIS — F43.22 ADJUSTMENT DISORDER WITH ANXIETY: Primary | ICD-10-CM

## 2023-03-01 PROCEDURE — 99999 PR PBB SHADOW E&M-EST. PATIENT-LVL I: ICD-10-PCS | Mod: PBBFAC,,, | Performed by: STUDENT IN AN ORGANIZED HEALTH CARE EDUCATION/TRAINING PROGRAM

## 2023-03-01 PROCEDURE — 99999 PR PBB SHADOW E&M-EST. PATIENT-LVL I: CPT | Mod: PBBFAC,,, | Performed by: STUDENT IN AN ORGANIZED HEALTH CARE EDUCATION/TRAINING PROGRAM

## 2023-03-01 PROCEDURE — 90834 PR PSYCHOTHERAPY W/PATIENT, 45 MIN: ICD-10-PCS | Mod: S$GLB,,, | Performed by: STUDENT IN AN ORGANIZED HEALTH CARE EDUCATION/TRAINING PROGRAM

## 2023-03-01 PROCEDURE — 90834 PSYTX W PT 45 MINUTES: CPT | Mod: S$GLB,,, | Performed by: STUDENT IN AN ORGANIZED HEALTH CARE EDUCATION/TRAINING PROGRAM

## 2023-03-02 ENCOUNTER — TELEPHONE (OUTPATIENT)
Dept: ENDOSCOPY | Facility: HOSPITAL | Age: 43
End: 2023-03-02
Payer: OTHER GOVERNMENT

## 2023-03-02 NOTE — TELEPHONE ENCOUNTER
Contacted patient to schedule EGD. No answer. LVM for patient to call the endoscopy scheduling department at [phone].

## 2023-03-06 ENCOUNTER — PATIENT MESSAGE (OUTPATIENT)
Dept: PODIATRY | Facility: CLINIC | Age: 43
End: 2023-03-06
Payer: OTHER GOVERNMENT

## 2023-03-06 ENCOUNTER — PATIENT MESSAGE (OUTPATIENT)
Dept: INTERNAL MEDICINE | Facility: CLINIC | Age: 43
End: 2023-03-06
Payer: OTHER GOVERNMENT

## 2023-03-06 DIAGNOSIS — F41.9 ANXIETY: Primary | ICD-10-CM

## 2023-03-07 ENCOUNTER — PATIENT MESSAGE (OUTPATIENT)
Dept: INTERNAL MEDICINE | Facility: CLINIC | Age: 43
End: 2023-03-07
Payer: OTHER GOVERNMENT

## 2023-03-07 RX ORDER — BUSPIRONE HYDROCHLORIDE 5 MG/1
5 TABLET ORAL 2 TIMES DAILY PRN
Qty: 60 TABLET | Refills: 2 | Status: SHIPPED | OUTPATIENT
Start: 2023-03-07 | End: 2023-07-13

## 2023-03-07 NOTE — TELEPHONE ENCOUNTER
Pt requested a different medication for anxiety as the hydroxyzine is too sedating at times & she would like a medication for daytime use. Previously discussed buspirone as alternative with Staff during last office visit. Will prescribe buspirone trial.

## 2023-03-13 ENCOUNTER — OFFICE VISIT (OUTPATIENT)
Dept: PSYCHIATRY | Facility: CLINIC | Age: 43
End: 2023-03-13
Payer: OTHER GOVERNMENT

## 2023-03-13 DIAGNOSIS — F43.22 ADJUSTMENT DISORDER WITH ANXIETY: Primary | ICD-10-CM

## 2023-03-13 PROCEDURE — 90834 PR PSYCHOTHERAPY W/PATIENT, 45 MIN: ICD-10-PCS | Mod: 95,,, | Performed by: STUDENT IN AN ORGANIZED HEALTH CARE EDUCATION/TRAINING PROGRAM

## 2023-03-13 PROCEDURE — 90834 PSYTX W PT 45 MINUTES: CPT | Mod: 95,,, | Performed by: STUDENT IN AN ORGANIZED HEALTH CARE EDUCATION/TRAINING PROGRAM

## 2023-03-20 NOTE — PROGRESS NOTES
Individual Psychotherapy  Date: 2/15/2023  Site: [facility]       Session Number: 2  Present in Session: Patient  Therapeutic Intervention: Cognitive Behavioral Therapy; Outpatient - Behavior modifying psychotherapy 60 min - CPT code 20230  Chief complaint/reason for encounter: Anxiety, stress     Interval history and content of current session: Patient reported moderate anxiety symptoms over the past 2 weeks. Symptoms are directly related to daughter's current medical stress. Patient completed goal of initiating care with a PCP at [facility]. She was prescribed Buspar and Hydroxyzine. Patient described daughter's difficulties with withdrawal symptoms, agitation, vomiting. Validated and normalized distress. Patient noted that communication between she and her  is good. She reported that her 8 y/o came to the hospital for a visit. Patient described the visit as a success. We briefly reviewed daughter's hospital course. Patient recognizes how she has succeeded in providing excellent care to daughter. Encouraged good self-care behaviors (physical activity, nutrition, hydration, sleep, medication adherence).    Mental Status Exam & Behavioral Observations  Appearance:  Good grooming and hygiene. Dressed in liset-shirt and athletic pants. Appeared stated age.      Orientation: Oriented x 4.   Speech: Normal rate, volume, and prosody..    Thought Processes: Linear and goal-directed.      Thought Content: Normal. No suicidal or homicidal ideation. No indications of obsessions or delusions.   Mood: Dysphoric  Anxious.   Affect: Full range, congruent with mood and session content..   Attention & Concentration: Intact. No deficits noted.   Insight: Excellent   Judgment: Excellent.   Behavioral Observations: Cooperative and engaged. Seated in chair. Good eye contact. No signs of psychomotor agitation or retardation.     Treatment plan:  Target symptoms: Anxiety  Why chosen therapy is appropriate versus another  modality: relevant to diagnosis, patient responds to this modality, evidence based practice  Outcome monitoring methods: self-report, checklist/rating scale  Therapeutic intervention type: behavior modifying psychotherapy, supportive psychotherapy    Risk parameters:  Patient reports no suicidal ideation  Patient reports no homicidal ideation  Patient reports no self-injurious behavior  Patient reports no violent behavior    Verbal deficits: None    Patient's response to intervention:  The patient's response to intervention is accepting.    Progress toward goals and other mental status changes:  The patient's progress toward goals is good.    Diagnosis:     ICD-10-CM ICD-9-CM   1. Adjustment disorder with anxiety  F43.22 309.24       Plan: Individual psychotherapy; CBT sessions as needed.    Return to clinic: as needed    Length of Service: 50 minutes

## 2023-03-22 NOTE — PROGRESS NOTES
"  Individual Psychotherapy  Date: 2/22/2023  Site: [facility]       Session Number: 3  Present in Session: Patient  Therapeutic Intervention: Cognitive Behavioral Therapy; Outpatient - Behavior modifying psychotherapy 60 min - CPT code 64277  Chief complaint/reason for encounter: Anxiety, stress     Interval history and content of current session: Patient reported moderate anxiety and depressive symptoms over the past 2 weeks. Symptoms are directly related to daughter's current medical stress. Patient reported that she and her  are making good progress with caregiver training. She noted plans for an upcoming "go trip," an opportunity to practice transporting daughter in a controlled environment. Patient described new existential concerns regarding Nondenominational and the use of Nondenominational-based coping. Patient noted that her  has become more interested in Nondenominational throughout this ordeal, while she finds that she is less interested. Patient described anxiety associated with the worry that "incorrect" prayers will have a negative effect on daughter. Guided patient in process of challenging and adjusting maladaptive thinking. Validated and normalized distress. Encouraged good self-care behaviors (physical activity, nutrition, hydration, sleep, medication adherence).    Mental Status Exam & Behavioral Observations  Appearance:  Good grooming and hygiene. Dressed in liset-shirt and athletic pants. Appeared stated age.      Orientation: Oriented x 4.   Speech: Normal rate, volume, and prosody..    Thought Processes: Linear and goal-directed.      Thought Content: Normal. No suicidal or homicidal ideation. No indications of obsessions or delusions.   Mood: Dysphoric  Anxious.   Affect: Full range, congruent with mood and session content..   Attention & Concentration: Intact. No deficits noted.   Insight: Excellent   Judgment: Excellent.   Behavioral Observations: Cooperative and engaged. Seated in chair. Good eye " contact. No signs of psychomotor agitation or retardation.     Treatment plan:  Target symptoms: Anxiety  Why chosen therapy is appropriate versus another modality: relevant to diagnosis, patient responds to this modality, evidence based practice  Outcome monitoring methods: self-report, checklist/rating scale  Therapeutic intervention type: behavior modifying psychotherapy, supportive psychotherapy    Risk parameters:  Patient reports no suicidal ideation  Patient reports no homicidal ideation  Patient reports no self-injurious behavior  Patient reports no violent behavior    Verbal deficits: None    Patient's response to intervention:  The patient's response to intervention is accepting.    Progress toward goals and other mental status changes:  The patient's progress toward goals is good.    Diagnosis:     ICD-10-CM ICD-9-CM   1. Adjustment disorder with anxiety  F43.22 309.24       Plan: Individual psychotherapy; CBT sessions as needed.    Return to clinic: as needed    Length of Service: 65 minutes

## 2023-03-27 ENCOUNTER — PATIENT MESSAGE (OUTPATIENT)
Dept: PSYCHIATRY | Facility: CLINIC | Age: 43
End: 2023-03-27

## 2023-03-30 NOTE — PROGRESS NOTES
"  Individual Psychotherapy  Date: 3/1/2023  Site: [facility]       Session Number: 4  Present in Session: Patient  Therapeutic Intervention: Cognitive Behavioral Therapy  Chief complaint/reason for encounter: Anxiety, stress     Interval history and content of current session: Patient reported moderate anxiety and depressive symptoms over the past 2 weeks. Symptoms are directly related to daughter's current medical stress. Patient reported that she and her  continue to make progress with caregiver training. She described feeling more confident. Patient noted that she and  independently changed the trach tube. Also completed first "go trip," a practice outing with their daughter. Patient noted that the outing went well. Patient described concerns regarding inconsistent quality of care for daughter. Validated and normalized distress. Reviewed communication skills. Encouraged good self-care behaviors (physical activity, nutrition, hydration, sleep, medication adherence).    Mental Status Exam & Behavioral Observations  Appearance:  Good grooming and hygiene. Dressed in liset-shirt and athletic pants. Appeared stated age.      Orientation: Oriented x 4.   Speech: Normal rate, volume, and prosody..    Thought Processes: Linear and goal-directed.      Thought Content: Normal. No suicidal or homicidal ideation. No indications of obsessions or delusions.   Mood: Dysphoric  Anxious.   Affect: Full range, congruent with mood and session content..   Attention & Concentration: Intact. No deficits noted.   Insight: Excellent   Judgment: Excellent.   Behavioral Observations: Cooperative and engaged. Seated in chair. Good eye contact. No signs of psychomotor agitation or retardation.     Treatment plan:  Target symptoms: Anxiety  Why chosen therapy is appropriate versus another modality: relevant to diagnosis, patient responds to this modality, evidence based practice  Outcome monitoring methods: self-report, " checklist/rating scale  Therapeutic intervention type: behavior modifying psychotherapy, supportive psychotherapy    Risk parameters:  Patient reports no suicidal ideation  Patient reports no homicidal ideation  Patient reports no self-injurious behavior  Patient reports no violent behavior    Verbal deficits: None    Patient's response to intervention:  The patient's response to intervention is accepting.    Progress toward goals and other mental status changes:  The patient's progress toward goals is good.    Diagnosis:     ICD-10-CM ICD-9-CM   1. Adjustment disorder with anxiety  F43.22 309.24       Plan: Individual psychotherapy; CBT sessions as needed.    Return to clinic: as needed    Length of Service: 45 minutes

## 2023-04-03 ENCOUNTER — OFFICE VISIT (OUTPATIENT)
Dept: PSYCHIATRY | Facility: CLINIC | Age: 43
End: 2023-04-03
Payer: OTHER GOVERNMENT

## 2023-04-03 DIAGNOSIS — F43.22 ADJUSTMENT DISORDER WITH ANXIETY: Primary | ICD-10-CM

## 2023-04-03 PROCEDURE — 90834 PR PSYCHOTHERAPY W/PATIENT, 45 MIN: ICD-10-PCS | Mod: 95,,, | Performed by: STUDENT IN AN ORGANIZED HEALTH CARE EDUCATION/TRAINING PROGRAM

## 2023-04-03 PROCEDURE — 90834 PSYTX W PT 45 MINUTES: CPT | Mod: 95,,, | Performed by: STUDENT IN AN ORGANIZED HEALTH CARE EDUCATION/TRAINING PROGRAM

## 2023-04-11 ENCOUNTER — PATIENT MESSAGE (OUTPATIENT)
Dept: PSYCHIATRY | Facility: CLINIC | Age: 43
End: 2023-04-11
Payer: OTHER GOVERNMENT

## 2023-04-15 NOTE — PROGRESS NOTES
"  Individual Psychotherapy  Date: 3/13/2023  Site: [facility]       Session Number: 5  Present in Session: Patient  Therapeutic Intervention: Cognitive Behavioral Therapy  Chief complaint/reason for encounter: Anxiety, stress     Interval history and content of current session: Patient reported moderate anxiety symptoms over the past 2 weeks as well as occasional periods of low mood. Symptoms are directly linked to daughter's current hospitalization. Patient described frustration related to nursing errors. She explained that several doses of morphine were mistakenly missed, which caused daughter to experience withdrawal symptoms. Validated and normalized distress. Patient noted plans to discuss issue with Patient Advocacy office. Reviewed cognitive restructuring skills introduced in a previous session (catch it, check it, change it). Guided patient in process of identifying, challenging, and adjusting dysfunctioning beliefs ("My daughter will never get out of the hospital."). Encouraged good self-care behaviors (physical activity, nutrition, hydration, sleep, medication adherence).    Mental Status Exam & Behavioral Observations  Appearance:  Good grooming and hygiene. Dressed in liset-shirt and athletic pants. Appeared stated age.      Orientation: Oriented x 4.   Speech: Normal rate, volume, and prosody..    Thought Processes: Linear and goal-directed.      Thought Content: Normal. No suicidal or homicidal ideation. No indications of obsessions or delusions.   Mood: Dysphoric  Anxious.   Affect: Full range, congruent with mood and session content..   Attention & Concentration: Intact. No deficits noted.   Insight: Excellent   Judgment: Excellent.   Behavioral Observations: Cooperative and engaged. Seated in chair. Good eye contact. No signs of psychomotor agitation or retardation.     Treatment plan:  Target symptoms: Anxiety  Why chosen therapy is appropriate versus another modality: relevant to diagnosis, " patient responds to this modality, evidence based practice  Outcome monitoring methods: self-report, checklist/rating scale  Therapeutic intervention type: behavior modifying psychotherapy, supportive psychotherapy    Risk parameters:  Patient reports no suicidal ideation  Patient reports no homicidal ideation  Patient reports no self-injurious behavior  Patient reports no violent behavior    Verbal deficits: None    Patient's response to intervention:  The patient's response to intervention is accepting.    Progress toward goals and other mental status changes:  The patient's progress toward goals is good.    Diagnosis:     ICD-10-CM ICD-9-CM   1. Adjustment disorder with anxiety  F43.22 309.24       Plan: Individual psychotherapy; CBT sessions as needed.    Return to clinic: as needed    Length of Service: 45 minutes

## 2023-05-04 ENCOUNTER — PATIENT MESSAGE (OUTPATIENT)
Dept: PSYCHIATRY | Facility: CLINIC | Age: 43
End: 2023-05-04
Payer: OTHER GOVERNMENT

## 2023-05-04 ENCOUNTER — OFFICE VISIT (OUTPATIENT)
Dept: PSYCHIATRY | Facility: CLINIC | Age: 43
End: 2023-05-04
Payer: OTHER GOVERNMENT

## 2023-05-04 DIAGNOSIS — F43.22 ADJUSTMENT DISORDER WITH ANXIETY: Primary | ICD-10-CM

## 2023-05-04 PROCEDURE — 90834 PSYTX W PT 45 MINUTES: CPT | Mod: 95,,, | Performed by: STUDENT IN AN ORGANIZED HEALTH CARE EDUCATION/TRAINING PROGRAM

## 2023-05-04 PROCEDURE — 90834 PR PSYCHOTHERAPY W/PATIENT, 45 MIN: ICD-10-PCS | Mod: 95,,, | Performed by: STUDENT IN AN ORGANIZED HEALTH CARE EDUCATION/TRAINING PROGRAM

## 2023-05-15 NOTE — PROGRESS NOTES
"  Individual Psychotherapy  Date: 4/3/2023  Site: [facility]       Session Number: 6  Present in Session: Patient  Therapeutic Intervention: Cognitive Behavioral Therapy  Chief complaint/reason for encounter: Anxiety, stress     Interval history and content of current session: Patient reported improved mood and decreased anxiety over the past 2 weeks. She stated that the family's transition home has gone well overall. Patient has not yet returned to work. She plans to return in a few weeks. She noted worries that she will be inaccessible to her family while at work. 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 Her office is roughly 30 minutes from home. Discussed plan to contact her supervisor to create "emergency plan." Patient described anxiety associated with daughter's poor prognosis. Validated and normalized distress. Patient described commitment to remaining focused on present moment despite high stress. Overall, patient appears to be coping well.     Mental Status Exam & Behavioral Observations  Appearance:  Good grooming and hygiene. Dressed in liset-shirt and athletic pants. Appeared stated age.      Orientation: Oriented x 4.   Speech: Normal rate, volume, and prosody..    Thought Processes: Linear and goal-directed.      Thought Content: Normal. No suicidal or homicidal ideation. No indications of obsessions or delusions.   Mood: Dysphoric  Anxious.   Affect: Full range, congruent with mood and session content..   Attention & Concentration: Intact. No deficits noted.   Insight: Excellent   Judgment: Excellent.   Behavioral Observations: Cooperative and engaged. Seated in chair. Good eye contact. No signs of psychomotor agitation or retardation.     Treatment plan:  Target symptoms: Anxiety  Why chosen therapy is appropriate versus another modality: relevant to diagnosis, patient responds to this modality, evidence based practice  Outcome " monitoring methods: self-report, checklist/rating scale  Therapeutic intervention type: behavior modifying psychotherapy, supportive psychotherapy    Risk parameters:  Patient reports no suicidal ideation  Patient reports no homicidal ideation  Patient reports no self-injurious behavior  Patient reports no violent behavior    Verbal deficits: None    Patient's response to intervention:  The patient's response to intervention is accepting.    Progress toward goals and other mental status changes:  The patient's progress toward goals is good.    Diagnosis:     ICD-10-CM ICD-9-CM   1. Adjustment disorder with anxiety  F43.22 309.24       Plan: Individual psychotherapy; CBT sessions as needed.    Return to clinic: as needed    Length of Service: 50 minutes    Telemedicine Details  The patient location is: [place]  The chief complaint leading to consultation is: Anxiety, depression, coping with caregiver stress    Visit type: audiovisual    Face to Face time with patient: 50 minutes  50 minutes of total time spent on the encounter, which includes face to face time and non-face to face time preparing to see the patient (eg, review of tests), Obtaining and/or reviewing separately obtained history, Documenting clinical information in the electronic or other health record, Independently interpreting results (not separately reported) and communicating results to the patient/family/caregiver, or Care coordination (not separately reported).         Each patient to whom he or she provides medical services by telemedicine is:  (1) informed of the relationship between the physician and patient and the respective role of any other health care provider with respect to management of the patient; and (2) notified that he or she may decline to receive medical services by telemedicine and may withdraw from such care at any time.    Notes:

## 2023-06-14 NOTE — PROGRESS NOTES
Individual Psychotherapy  Date: 5/4/2023  Site: [facility]       Session Number: 7  Present in Session: Patient  Therapeutic Intervention: Cognitive Behavioral Therapy  Chief complaint/reason for encounter: Anxiety, stress     Interval history and content of current session: Patient reported increased anxiety over the past few weeks. Also reported daily periods of low mood that typically last a few hours. She clarified that low mood persisted all day on 3/14 days in the past 2 weeks. She noted that her daughter has developed dysautonomia. Patient described stress associated with financial strain. Elicited description of goals for psychotherapy: declutter house, strengthen relationship with , adjust overly self-critical attitude. Assisted patient in creating a plan for declutter goal. Provided brief overview of psychotherapy.    Mental Status Exam & Behavioral Observations  Appearance:  Good grooming and hygiene. Dressed in liset-shirt and athletic pants. Appeared stated age.      Orientation: Oriented x 4.   Speech: Normal rate, volume, and prosody..    Thought Processes: Linear and goal-directed.      Thought Content: Normal. No suicidal or homicidal ideation. No indications of obsessions or delusions.   Mood: Dysphoric  Anxious.   Affect: Full range, congruent with mood and session content..   Attention & Concentration: Intact. No deficits noted.   Insight: Excellent   Judgment: Excellent.   Behavioral Observations: Cooperative and engaged. Seated in chair. Good eye contact. No signs of psychomotor agitation or retardation.     Treatment plan:  Target symptoms: Anxiety  Why chosen therapy is appropriate versus another modality: relevant to diagnosis, patient responds to this modality, evidence based practice  Outcome monitoring methods: self-report, checklist/rating scale  Therapeutic intervention type: behavior modifying psychotherapy, supportive psychotherapy    Risk parameters:  Patient reports no  suicidal ideation  Patient reports no homicidal ideation  Patient reports no self-injurious behavior  Patient reports no violent behavior    Verbal deficits: None    Patient's response to intervention:  The patient's response to intervention is accepting.    Progress toward goals and other mental status changes:  The patient's progress toward goals is good.    Diagnosis:     ICD-10-CM ICD-9-CM   1. Adjustment disorder with anxiety  F43.22 309.24       Plan: Individual psychotherapy; CBT sessions as needed.    Return to clinic: 2 weeks    Length of Service: 50 minutes    Telemedicine Details  The patient location is: [place]  The chief complaint leading to consultation is: Anxiety, depression, coping with caregiver stress    Visit type: audiovisual    Face to Face time with patient: 50 minutes  50 minutes of total time spent on the encounter, which includes face to face time and non-face to face time preparing to see the patient (eg, review of tests), Obtaining and/or reviewing separately obtained history, Documenting clinical information in the electronic or other health record, Independently interpreting results (not separately reported) and communicating results to the patient/family/caregiver, or Care coordination (not separately reported).     Each patient to whom he or she provides medical services by telemedicine is:  (1) informed of the relationship between the physician and patient and the respective role of any other health care provider with respect to management of the patient; and (2) notified that he or she may decline to receive medical services by telemedicine and may withdraw from such care at any time.

## 2023-06-21 ENCOUNTER — TELEPHONE (OUTPATIENT)
Dept: ENDOSCOPY | Facility: HOSPITAL | Age: 43
End: 2023-06-21
Payer: OTHER GOVERNMENT

## 2023-06-22 ENCOUNTER — PATIENT MESSAGE (OUTPATIENT)
Dept: PSYCHIATRY | Facility: CLINIC | Age: 43
End: 2023-06-22

## 2023-06-22 ENCOUNTER — OFFICE VISIT (OUTPATIENT)
Dept: PSYCHIATRY | Facility: CLINIC | Age: 43
End: 2023-06-22
Payer: OTHER GOVERNMENT

## 2023-06-22 DIAGNOSIS — F43.22 ADJUSTMENT DISORDER WITH ANXIETY: Primary | ICD-10-CM

## 2023-06-22 PROCEDURE — 90837 PSYTX W PT 60 MINUTES: CPT | Mod: 95,,, | Performed by: STUDENT IN AN ORGANIZED HEALTH CARE EDUCATION/TRAINING PROGRAM

## 2023-06-22 PROCEDURE — 90837 PR PSYCHOTHERAPY W/PATIENT, 60 MIN: ICD-10-PCS | Mod: 95,,, | Performed by: STUDENT IN AN ORGANIZED HEALTH CARE EDUCATION/TRAINING PROGRAM

## 2023-06-22 NOTE — TELEPHONE ENCOUNTER
Dr Nichole,   You ordered a procedure on her. I spoke to her today. She says that her condition improved and she does not need EGD.

## 2023-07-06 ENCOUNTER — OFFICE VISIT (OUTPATIENT)
Dept: PSYCHIATRY | Facility: CLINIC | Age: 43
End: 2023-07-06
Payer: OTHER GOVERNMENT

## 2023-07-06 DIAGNOSIS — F43.22 ADJUSTMENT DISORDER WITH ANXIETY: Primary | ICD-10-CM

## 2023-07-06 PROCEDURE — 90834 PR PSYCHOTHERAPY W/PATIENT, 45 MIN: ICD-10-PCS | Mod: 95,,, | Performed by: STUDENT IN AN ORGANIZED HEALTH CARE EDUCATION/TRAINING PROGRAM

## 2023-07-06 PROCEDURE — 90834 PSYTX W PT 45 MINUTES: CPT | Mod: 95,,, | Performed by: STUDENT IN AN ORGANIZED HEALTH CARE EDUCATION/TRAINING PROGRAM

## 2023-07-12 ENCOUNTER — PATIENT MESSAGE (OUTPATIENT)
Dept: INTERNAL MEDICINE | Facility: CLINIC | Age: 43
End: 2023-07-12
Payer: OTHER GOVERNMENT

## 2023-07-12 DIAGNOSIS — F43.22 ADJUSTMENT DISORDER WITH ANXIETY: Primary | ICD-10-CM

## 2023-07-13 ENCOUNTER — PATIENT MESSAGE (OUTPATIENT)
Dept: INTERNAL MEDICINE | Facility: CLINIC | Age: 43
End: 2023-07-13
Payer: OTHER GOVERNMENT

## 2023-07-13 DIAGNOSIS — F43.22 ADJUSTMENT DISORDER WITH ANXIETY: ICD-10-CM

## 2023-07-13 RX ORDER — SERTRALINE HYDROCHLORIDE 25 MG/1
25 TABLET, FILM COATED ORAL DAILY
Qty: 90 TABLET | Refills: 0 | Status: SHIPPED | OUTPATIENT
Start: 2023-07-13 | End: 2023-10-11

## 2023-07-13 RX ORDER — SERTRALINE HYDROCHLORIDE 25 MG/1
25 TABLET, FILM COATED ORAL DAILY
Qty: 90 TABLET | Refills: 0 | Status: SHIPPED | OUTPATIENT
Start: 2023-07-13 | End: 2023-07-13 | Stop reason: SDUPTHER

## 2023-07-13 NOTE — TELEPHONE ENCOUNTER
Dr Baker (Psychology) notified me that pt found buspirone to be too sedating, similar to prior trial of hydroxyzine.  Pt expressed interest in trying antidepressant such as SSRI.  Will try sertraline --> fluoxetine --> citalopram/escitalopram.  Counseled pt that SSRIs typically take 4-6 wks for effects to be noticed.  Pt verbalized understanding.

## 2023-07-20 ENCOUNTER — OFFICE VISIT (OUTPATIENT)
Dept: PSYCHIATRY | Facility: CLINIC | Age: 43
End: 2023-07-20
Payer: OTHER GOVERNMENT

## 2023-07-20 DIAGNOSIS — F43.22 ADJUSTMENT DISORDER WITH ANXIETY: Primary | ICD-10-CM

## 2023-07-20 PROCEDURE — 90834 PSYTX W PT 45 MINUTES: CPT | Mod: 95,,, | Performed by: STUDENT IN AN ORGANIZED HEALTH CARE EDUCATION/TRAINING PROGRAM

## 2023-07-20 PROCEDURE — 90834 PR PSYCHOTHERAPY W/PATIENT, 45 MIN: ICD-10-PCS | Mod: 95,,, | Performed by: STUDENT IN AN ORGANIZED HEALTH CARE EDUCATION/TRAINING PROGRAM

## 2023-07-20 NOTE — PROGRESS NOTES
Individual Psychotherapy  Date: 6/22/2023  Site: [facility]       Session Number: 8  Present in Session: Patient  Therapeutic Intervention: Cognitive Behavioral Therapy  Chief complaint/reason for encounter: Anxiety, stress     Interval history and content of current session: Patient reported moderate anxiety and depressive symptoms over the past 2 weeks. Symptoms are directly linked to daughter's medical stress. Patient noted that her daughter's medicaid application was approved. She is now in the process of interviewing home health nurses. Patient is hopeful that nursing care at home will help reduce stress. Patient stated that she does not take Buspar because it is too sedating. She is interested in trying a different medication and will reach out to her PCP. Reviewed self-care plan. Normalized and validated distress.    Mental Status Exam & Behavioral Observations  Appearance:  Good grooming and hygiene. Dressed in liset-shirt and athletic pants. Appeared stated age.      Orientation: Oriented x 4.   Speech: Normal rate, volume, and prosody..    Thought Processes: Linear and goal-directed.      Thought Content: Normal. No suicidal or homicidal ideation. No indications of obsessions or delusions.   Mood: Dysphoric  Anxious.   Affect: Full range, congruent with mood and session content..   Attention & Concentration: Intact. No deficits noted.   Insight: Excellent   Judgment: Excellent.   Behavioral Observations: Cooperative and engaged. Seated in chair. Good eye contact. No signs of psychomotor agitation or retardation.     Treatment plan:  Target symptoms: Anxiety  Why chosen therapy is appropriate versus another modality: relevant to diagnosis, patient responds to this modality, evidence based practice  Outcome monitoring methods: self-report, checklist/rating scale  Therapeutic intervention type: behavior modifying psychotherapy, supportive psychotherapy    Risk parameters:  Patient reports no suicidal  ideation  Patient reports no homicidal ideation  Patient reports no self-injurious behavior  Patient reports no violent behavior    Verbal deficits: None    Patient's response to intervention:  The patient's response to intervention is accepting.    Progress toward goals and other mental status changes:  The patient's progress toward goals is good.    Diagnosis:     ICD-10-CM ICD-9-CM   1. Adjustment disorder with anxiety  F43.22 309.24       Plan: Individual psychotherapy; CBT sessions as needed.    Return to clinic: 2 weeks    Length of Service: 59 minutes    Telemedicine Details  The patient location is: [place]  The chief complaint leading to consultation is: Anxiety, depression, coping with caregiver stress    Visit type: audiovisual    Face to Face time with patient: 59 minutes  59 minutes of total time spent on the encounter, which includes face to face time and non-face to face time preparing to see the patient (eg, review of tests), Obtaining and/or reviewing separately obtained history, Documenting clinical information in the electronic or other health record, Independently interpreting results (not separately reported) and communicating results to the patient/family/caregiver, or Care coordination (not separately reported).     Each patient to whom he or she provides medical services by telemedicine is:  (1) informed of the relationship between the physician and patient and the respective role of any other health care provider with respect to management of the patient; and (2) notified that he or she may decline to receive medical services by telemedicine and may withdraw from such care at any time.

## 2023-08-10 ENCOUNTER — OFFICE VISIT (OUTPATIENT)
Dept: PSYCHIATRY | Facility: CLINIC | Age: 43
End: 2023-08-10
Payer: OTHER GOVERNMENT

## 2023-08-10 DIAGNOSIS — F43.22 ADJUSTMENT DISORDER WITH ANXIETY: Primary | ICD-10-CM

## 2023-08-10 PROCEDURE — 90837 PR PSYCHOTHERAPY W/PATIENT, 60 MIN: ICD-10-PCS | Mod: 95,,, | Performed by: STUDENT IN AN ORGANIZED HEALTH CARE EDUCATION/TRAINING PROGRAM

## 2023-08-10 PROCEDURE — 90837 PSYTX W PT 60 MINUTES: CPT | Mod: 95,,, | Performed by: STUDENT IN AN ORGANIZED HEALTH CARE EDUCATION/TRAINING PROGRAM

## 2023-08-18 ENCOUNTER — PATIENT MESSAGE (OUTPATIENT)
Dept: PSYCHIATRY | Facility: CLINIC | Age: 43
End: 2023-08-18
Payer: OTHER GOVERNMENT

## 2023-08-18 NOTE — PROGRESS NOTES
Individual Psychotherapy  Date: 7/6/2023  Site: [facility]       Session Number: 8  Present in Session: Patient  Therapeutic Intervention: Cognitive Behavioral Therapy  Chief complaint/reason for encounter: Anxiety, stress     Interval history and content of current session: Patient reported improved mood and decreased anxiety over the past 2 weeks. She noted that her daughter is more alert, which is encouraging. Also noted that she has found some in home nursing care so she and her  have more support. 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 Guided patient in process of challenging and adjusting catastrophic thinking. Reviewed self-care goals. Reviewed plan to discuss antidepressant medication with PCP.    Mental Status Exam & Behavioral Observations  Appearance:  Good grooming and hygiene. Dressed in liset-shirt and athletic pants. Appeared stated age.      Orientation: Oriented x 4.   Speech: Normal rate, volume, and prosody..    Thought Processes: Linear and goal-directed.      Thought Content: Normal. No suicidal or homicidal ideation. No indications of obsessions or delusions.   Mood: Dysphoric  Anxious.   Affect: Full range, congruent with mood and session content..   Attention & Concentration: Intact. No deficits noted.   Insight: Excellent   Judgment: Excellent.   Behavioral Observations: Cooperative and engaged. Seated in chair. Good eye contact. No signs of psychomotor agitation or retardation.     Treatment plan:  Target symptoms: Anxiety  Why chosen therapy is appropriate versus another modality: relevant to diagnosis, patient responds to this modality, evidence based practice  Outcome monitoring methods: self-report, checklist/rating scale  Therapeutic intervention type: behavior modifying psychotherapy, supportive psychotherapy    Risk parameters:  Patient reports no suicidal ideation  Patient reports  no homicidal ideation  Patient reports no self-injurious behavior  Patient reports no violent behavior    Verbal deficits: None    Patient's response to intervention:  The patient's response to intervention is accepting.    Progress toward goals and other mental status changes:  The patient's progress toward goals is good.    Diagnosis:     ICD-10-CM ICD-9-CM   1. Adjustment disorder with anxiety  F43.22 309.24       Plan: Individual psychotherapy; CBT sessions as needed.    Return to clinic: 1 month    Length of Service: 50 minutes    Telemedicine Details  The patient location is: [place]  The chief complaint leading to consultation is: Anxiety, depression, coping with caregiver stress    Visit type: audiovisual    Face to Face time with patient: 50 minutes  50 minutes of total time spent on the encounter, which includes face to face time and non-face to face time preparing to see the patient (eg, review of tests), Obtaining and/or reviewing separately obtained history, Documenting clinical information in the electronic or other health record, Independently interpreting results (not separately reported) and communicating results to the patient/family/caregiver, or Care coordination (not separately reported).     Each patient to whom he or she provides medical services by telemedicine is:  (1) informed of the relationship between the physician and patient and the respective role of any other health care provider with respect to management of the patient; and (2) notified that he or she may decline to receive medical services by telemedicine and may withdraw from such care at any time.

## 2023-08-24 ENCOUNTER — PATIENT MESSAGE (OUTPATIENT)
Dept: PSYCHIATRY | Facility: CLINIC | Age: 43
End: 2023-08-24

## 2023-08-24 ENCOUNTER — OFFICE VISIT (OUTPATIENT)
Dept: PSYCHIATRY | Facility: CLINIC | Age: 43
End: 2023-08-24
Payer: OTHER GOVERNMENT

## 2023-08-24 DIAGNOSIS — F43.22 ADJUSTMENT DISORDER WITH ANXIETY: Primary | ICD-10-CM

## 2023-08-24 PROCEDURE — 90837 PSYTX W PT 60 MINUTES: CPT | Mod: 95,,, | Performed by: STUDENT IN AN ORGANIZED HEALTH CARE EDUCATION/TRAINING PROGRAM

## 2023-08-24 PROCEDURE — 90837 PR PSYCHOTHERAPY W/PATIENT, 60 MIN: ICD-10-PCS | Mod: 95,,, | Performed by: STUDENT IN AN ORGANIZED HEALTH CARE EDUCATION/TRAINING PROGRAM

## 2023-08-27 ENCOUNTER — PATIENT MESSAGE (OUTPATIENT)
Dept: PSYCHIATRY | Facility: CLINIC | Age: 43
End: 2023-08-27
Payer: OTHER GOVERNMENT

## 2023-09-06 ENCOUNTER — OFFICE VISIT (OUTPATIENT)
Dept: PSYCHIATRY | Facility: CLINIC | Age: 43
End: 2023-09-06
Payer: OTHER GOVERNMENT

## 2023-09-06 DIAGNOSIS — F43.22 ADJUSTMENT DISORDER WITH ANXIETY: Primary | ICD-10-CM

## 2023-09-06 PROCEDURE — 90837 PR PSYCHOTHERAPY W/PATIENT, 60 MIN: ICD-10-PCS | Mod: 95,,, | Performed by: STUDENT IN AN ORGANIZED HEALTH CARE EDUCATION/TRAINING PROGRAM

## 2023-09-06 PROCEDURE — 90837 PSYTX W PT 60 MINUTES: CPT | Mod: 95,,, | Performed by: STUDENT IN AN ORGANIZED HEALTH CARE EDUCATION/TRAINING PROGRAM

## 2023-09-08 NOTE — PROGRESS NOTES
Individual Psychotherapy  Date: 7/20/2023  Site: [facility]       Session Number: 9  Present in Session: Patient  Therapeutic Intervention: Cognitive Behavioral Therapy  Chief complaint/reason for encounter: Anxiety, stress     Interval history and content of current session: Patient reported mild anxiety and depressive symptoms over the past week. Symptoms are directly related to daughter's medical condition. Patient described a recent incident at work where her boss made a statement about how patient's family issues interfere with work. Reviewed communication skills. Reviewed self-care plan. Reviewed cognitive restructuring skills (catch it, check it, change it). Patient stated that she recently started Zoloft.    Mental Status Exam & Behavioral Observations  Appearance:  Good grooming and hygiene. Dressed in liset-shirt and athletic pants. Appeared stated age.      Orientation: Oriented x 4.   Speech: Normal rate, volume, and prosody..    Thought Processes: Linear and goal-directed.      Thought Content: Normal. No suicidal or homicidal ideation. No indications of obsessions or delusions.   Mood: Dysphoric  Anxious.   Affect: Full range, congruent with mood and session content..   Attention & Concentration: Intact. No deficits noted.   Insight: Excellent   Judgment: Excellent.   Behavioral Observations: Cooperative and engaged. Seated in chair. Good eye contact. No signs of psychomotor agitation or retardation.     Treatment plan:  Target symptoms: Anxiety  Why chosen therapy is appropriate versus another modality: relevant to diagnosis, patient responds to this modality, evidence based practice  Outcome monitoring methods: self-report, checklist/rating scale  Therapeutic intervention type: behavior modifying psychotherapy, supportive psychotherapy    Risk parameters:  Patient reports no suicidal ideation  Patient reports no homicidal ideation  Patient reports no self-injurious behavior  Patient reports no  violent behavior    Verbal deficits: None    Patient's response to intervention:  The patient's response to intervention is accepting.    Progress toward goals and other mental status changes:  The patient's progress toward goals is good.    Diagnosis:     ICD-10-CM ICD-9-CM   1. Adjustment disorder with anxiety  F43.22 309.24       Plan: Individual psychotherapy; CBT sessions as needed.    Return to clinic: 2 weeks    Length of Service: 50 minutes    Telemedicine Details  The patient location is: [place]  The chief complaint leading to consultation is: Anxiety, depression, coping with caregiver stress    Visit type: audiovisual    Face to Face time with patient: 50 minutes  50 minutes of total time spent on the encounter, which includes face to face time and non-face to face time preparing to see the patient (eg, review of tests), Obtaining and/or reviewing separately obtained history, Documenting clinical information in the electronic or other health record, Independently interpreting results (not separately reported) and communicating results to the patient/family/caregiver, or Care coordination (not separately reported).     Each patient to whom he or she provides medical services by telemedicine is:  (1) informed of the relationship between the physician and patient and the respective role of any other health care provider with respect to management of the patient; and (2) notified that he or she may decline to receive medical services by telemedicine and may withdraw from such care at any time.

## 2023-09-13 ENCOUNTER — OFFICE VISIT (OUTPATIENT)
Dept: PSYCHIATRY | Facility: CLINIC | Age: 43
End: 2023-09-13
Payer: OTHER GOVERNMENT

## 2023-09-13 ENCOUNTER — PATIENT MESSAGE (OUTPATIENT)
Dept: INTERNAL MEDICINE | Facility: CLINIC | Age: 43
End: 2023-09-13
Payer: OTHER GOVERNMENT

## 2023-09-13 DIAGNOSIS — F43.22 ADJUSTMENT DISORDER WITH ANXIETY: Primary | ICD-10-CM

## 2023-09-13 DIAGNOSIS — L29.9 PRURITUS: Primary | ICD-10-CM

## 2023-09-13 PROCEDURE — 90834 PSYTX W PT 45 MINUTES: CPT | Mod: 95,,, | Performed by: STUDENT IN AN ORGANIZED HEALTH CARE EDUCATION/TRAINING PROGRAM

## 2023-09-13 PROCEDURE — 90834 PR PSYCHOTHERAPY W/PATIENT, 45 MIN: ICD-10-PCS | Mod: 95,,, | Performed by: STUDENT IN AN ORGANIZED HEALTH CARE EDUCATION/TRAINING PROGRAM

## 2023-09-16 NOTE — PROGRESS NOTES
Individual Psychotherapy  Date: 8/10/2023  Site: [facility]       Session Number: 10  Present in Session: Patient  Therapeutic Intervention: Cognitive Behavioral Therapy  Chief complaint/reason for encounter: Anxiety, stress     Interval history and content of current session: Patient reported mild -moderate anxiety and depressive symptoms over the past week. Symptoms are directly related to daughter's medical condition. Symptoms have worsened somewhat over the past 2 weeks. 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 Validated and normalized distress. Reviewed self-care plan and encouraged good health behaviors (sleep, nutrition, hydration, physical activity, medication adherence). Reviewed exercise plan as well as reasons and abilities related to exercise goals. Patient reported 100% medication adherence with new Zoloft prescription.    Mental Status Exam & Behavioral Observations  Appearance:  Good grooming and hygiene. Dressed in liset-shirt and athletic pants. Appeared stated age.      Orientation: Oriented x 4.   Speech: Normal rate, volume, and prosody..    Thought Processes: Linear and goal-directed.      Thought Content: Normal. No suicidal or homicidal ideation. No indications of obsessions or delusions.   Mood: Dysphoric  Anxious.   Affect: Full range, congruent with mood and session content..   Attention & Concentration: Intact. No deficits noted.   Insight: Excellent   Judgment: Excellent.   Behavioral Observations: Cooperative and engaged. Seated in chair. Good eye contact. No signs of psychomotor agitation or retardation.     Treatment plan:  Target symptoms: Anxiety  Why chosen therapy is appropriate versus another modality: relevant to diagnosis, patient responds to this modality, evidence based practice  Outcome monitoring methods: self-report, checklist/rating scale  Therapeutic intervention type: behavior modifying psychotherapy, supportive  psychotherapy    Risk parameters:  Patient reports no suicidal ideation  Patient reports no homicidal ideation  Patient reports no self-injurious behavior  Patient reports no violent behavior    Verbal deficits: None    Patient's response to intervention:  The patient's response to intervention is accepting.    Progress toward goals and other mental status changes:  The patient's progress toward goals is good.    Diagnosis:     ICD-10-CM ICD-9-CM   1. Adjustment disorder with anxiety  F43.22 309.24       Plan: Individual psychotherapy; CBT sessions as needed.    Return to clinic: 2 weeks    Length of Service: 50 minutes    Telemedicine Details  The patient location is: [place]  The chief complaint leading to consultation is: Anxiety, depression, coping with caregiver stress    Visit type: audiovisual    Face to Face time with patient: 50 minutes  50 minutes of total time spent on the encounter, which includes face to face time and non-face to face time preparing to see the patient (eg, review of tests), Obtaining and/or reviewing separately obtained history, Documenting clinical information in the electronic or other health record, Independently interpreting results (not separately reported) and communicating results to the patient/family/caregiver, or Care coordination (not separately reported).     Each patient to whom he or she provides medical services by telemedicine is:  (1) informed of the relationship between the physician and patient and the respective role of any other health care provider with respect to management of the patient; and (2) notified that he or she may decline to receive medical services by telemedicine and may withdraw from such care at any time.

## 2023-09-20 ENCOUNTER — PATIENT MESSAGE (OUTPATIENT)
Dept: PSYCHIATRY | Facility: CLINIC | Age: 43
End: 2023-09-20

## 2023-10-03 NOTE — PROGRESS NOTES
Individual Psychotherapy  Date: 8/24/2023  Site: [facility]       Session Number: 10  Present in Session: Patient  Therapeutic Intervention: Cognitive Behavioral Therapy  Chief complaint/reason for encounter: Anxiety, stress     Interval history and content of current session: Patient reported mild -moderate anxiety and depressive symptoms over the past week. Symptoms are directly related to daughter's medical condition. Patient reported that her daughter's condition has improved somewhat. Discussed patient's beliefs about daughter's future and goal of remaining present-focused despite high stress. Reviewed patient's concerns regarding organ donation process. Validated and normalized distress. Reviewed plan for seeking social support. Reviewed self-care plan (sleep, nutrition, hydration, physical activity, medication adherence).    Mental Status Exam & Behavioral Observations  Appearance:  Good grooming and hygiene. Dressed in liset-shirt and athletic pants. Appeared stated age.      Orientation: Oriented x 4.   Speech: Normal rate, volume, and prosody..    Thought Processes: Linear and goal-directed.      Thought Content: Normal. No suicidal or homicidal ideation. No indications of obsessions or delusions.   Mood: Dysphoric  Anxious.   Affect: Full range, congruent with mood and session content..   Attention & Concentration: Intact. No deficits noted.   Insight: Excellent   Judgment: Excellent.   Behavioral Observations: Cooperative and engaged. Seated in chair. Good eye contact. No signs of psychomotor agitation or retardation.     Treatment plan:  Target symptoms: Anxiety  Why chosen therapy is appropriate versus another modality: relevant to diagnosis, patient responds to this modality, evidence based practice  Outcome monitoring methods: self-report, checklist/rating scale  Therapeutic intervention type: behavior modifying psychotherapy, supportive psychotherapy    Risk parameters:  Patient reports no  suicidal ideation  Patient reports no homicidal ideation  Patient reports no self-injurious behavior  Patient reports no violent behavior    Verbal deficits: None    Patient's response to intervention:  The patient's response to intervention is accepting.    Progress toward goals and other mental status changes:  The patient's progress toward goals is good.    Diagnosis:     ICD-10-CM ICD-9-CM   1. Adjustment disorder with anxiety  F43.22 309.24       Plan: Individual psychotherapy; CBT sessions as needed.    Return to clinic: 2 weeks    Length of Service: 58 minutes    Telemedicine Details  The patient location is: [place]  The chief complaint leading to consultation is: Anxiety, depression, coping with caregiver stress    Visit type: audiovisual    Face to Face time with patient: 58 minutes  58 minutes of total time spent on the encounter, which includes face to face time and non-face to face time preparing to see the patient (eg, review of tests), Obtaining and/or reviewing separately obtained history, Documenting clinical information in the electronic or other health record, Independently interpreting results (not separately reported) and communicating results to the patient/family/caregiver, or Care coordination (not separately reported).     Each patient to whom he or she provides medical services by telemedicine is:  (1) informed of the relationship between the physician and patient and the respective role of any other health care provider with respect to management of the patient; and (2) notified that he or she may decline to receive medical services by telemedicine and may withdraw from such care at any time.

## 2023-10-05 ENCOUNTER — OFFICE VISIT (OUTPATIENT)
Dept: PSYCHIATRY | Facility: CLINIC | Age: 43
End: 2023-10-05
Payer: OTHER GOVERNMENT

## 2023-10-05 DIAGNOSIS — F43.22 ADJUSTMENT DISORDER WITH ANXIETY: Primary | ICD-10-CM

## 2023-10-05 PROCEDURE — 90834 PSYTX W PT 45 MINUTES: CPT | Mod: 95,,, | Performed by: STUDENT IN AN ORGANIZED HEALTH CARE EDUCATION/TRAINING PROGRAM

## 2023-10-05 PROCEDURE — 90834 PR PSYCHOTHERAPY W/PATIENT, 45 MIN: ICD-10-PCS | Mod: 95,,, | Performed by: STUDENT IN AN ORGANIZED HEALTH CARE EDUCATION/TRAINING PROGRAM

## 2023-10-20 ENCOUNTER — LAB VISIT (OUTPATIENT)
Dept: LAB | Facility: HOSPITAL | Age: 43
End: 2023-10-20
Attending: INTERNAL MEDICINE
Payer: OTHER GOVERNMENT

## 2023-10-20 DIAGNOSIS — Z11.59 ENCOUNTER FOR HEPATITIS C SCREENING TEST FOR LOW RISK PATIENT: ICD-10-CM

## 2023-10-20 DIAGNOSIS — R53.83 FATIGUE, UNSPECIFIED TYPE: ICD-10-CM

## 2023-10-20 DIAGNOSIS — Z13.220 ENCOUNTER FOR SCREENING FOR LIPID DISORDER: ICD-10-CM

## 2023-10-20 DIAGNOSIS — Z00.00 HEALTH MAINTENANCE EXAMINATION: ICD-10-CM

## 2023-10-20 DIAGNOSIS — L29.9 PRURITUS: ICD-10-CM

## 2023-10-20 DIAGNOSIS — Z13.1 SCREENING FOR DIABETES MELLITUS: ICD-10-CM

## 2023-10-20 DIAGNOSIS — Z11.4 SCREENING FOR HIV (HUMAN IMMUNODEFICIENCY VIRUS): ICD-10-CM

## 2023-10-20 LAB
ALBUMIN SERPL BCP-MCNC: 4 G/DL (ref 3.5–5.2)
ALBUMIN SERPL BCP-MCNC: 4 G/DL (ref 3.5–5.2)
ALP SERPL-CCNC: 227 U/L (ref 55–135)
ALP SERPL-CCNC: 227 U/L (ref 55–135)
ALT SERPL W/O P-5'-P-CCNC: 85 U/L (ref 10–44)
ALT SERPL W/O P-5'-P-CCNC: 85 U/L (ref 10–44)
ANION GAP SERPL CALC-SCNC: 10 MMOL/L (ref 8–16)
ANION GAP SERPL CALC-SCNC: 10 MMOL/L (ref 8–16)
AST SERPL-CCNC: 50 U/L (ref 10–40)
AST SERPL-CCNC: 50 U/L (ref 10–40)
BASOPHILS # BLD AUTO: 0.02 K/UL (ref 0–0.2)
BASOPHILS # BLD AUTO: 0.02 K/UL (ref 0–0.2)
BASOPHILS NFR BLD: 0.4 % (ref 0–1.9)
BASOPHILS NFR BLD: 0.4 % (ref 0–1.9)
BILIRUB SERPL-MCNC: 0.6 MG/DL (ref 0.1–1)
BILIRUB SERPL-MCNC: 0.6 MG/DL (ref 0.1–1)
BUN SERPL-MCNC: 16 MG/DL (ref 6–20)
BUN SERPL-MCNC: 16 MG/DL (ref 6–20)
CALCIUM SERPL-MCNC: 9.4 MG/DL (ref 8.7–10.5)
CALCIUM SERPL-MCNC: 9.4 MG/DL (ref 8.7–10.5)
CHLORIDE SERPL-SCNC: 108 MMOL/L (ref 95–110)
CHLORIDE SERPL-SCNC: 108 MMOL/L (ref 95–110)
CHOLEST SERPL-MCNC: 314 MG/DL (ref 120–199)
CHOLEST/HDLC SERPL: 5.2 {RATIO} (ref 2–5)
CO2 SERPL-SCNC: 21 MMOL/L (ref 23–29)
CO2 SERPL-SCNC: 21 MMOL/L (ref 23–29)
CREAT SERPL-MCNC: 0.8 MG/DL (ref 0.5–1.4)
CREAT SERPL-MCNC: 0.8 MG/DL (ref 0.5–1.4)
DIFFERENTIAL METHOD: ABNORMAL
DIFFERENTIAL METHOD: ABNORMAL
EOSINOPHIL # BLD AUTO: 0.1 K/UL (ref 0–0.5)
EOSINOPHIL # BLD AUTO: 0.1 K/UL (ref 0–0.5)
EOSINOPHIL NFR BLD: 2.1 % (ref 0–8)
EOSINOPHIL NFR BLD: 2.1 % (ref 0–8)
ERYTHROCYTE [DISTWIDTH] IN BLOOD BY AUTOMATED COUNT: 13 % (ref 11.5–14.5)
ERYTHROCYTE [DISTWIDTH] IN BLOOD BY AUTOMATED COUNT: 13 % (ref 11.5–14.5)
EST. GFR  (NO RACE VARIABLE): >60 ML/MIN/1.73 M^2
EST. GFR  (NO RACE VARIABLE): >60 ML/MIN/1.73 M^2
ESTIMATED AVG GLUCOSE: 100 MG/DL (ref 68–131)
ESTIMATED AVG GLUCOSE: 100 MG/DL (ref 68–131)
GLUCOSE SERPL-MCNC: 117 MG/DL (ref 70–110)
GLUCOSE SERPL-MCNC: 117 MG/DL (ref 70–110)
HBA1C MFR BLD: 5.1 % (ref 4–5.6)
HBA1C MFR BLD: 5.1 % (ref 4–5.6)
HCT VFR BLD AUTO: 40.2 % (ref 37–48.5)
HCT VFR BLD AUTO: 40.2 % (ref 37–48.5)
HCV AB SERPL QL IA: NORMAL
HDLC SERPL-MCNC: 60 MG/DL (ref 40–75)
HDLC SERPL: 19.1 % (ref 20–50)
HGB BLD-MCNC: 13.6 G/DL (ref 12–16)
HGB BLD-MCNC: 13.6 G/DL (ref 12–16)
HIV 1+2 AB+HIV1 P24 AG SERPL QL IA: NORMAL
IMM GRANULOCYTES # BLD AUTO: 0.01 K/UL (ref 0–0.04)
IMM GRANULOCYTES # BLD AUTO: 0.01 K/UL (ref 0–0.04)
IMM GRANULOCYTES NFR BLD AUTO: 0.2 % (ref 0–0.5)
IMM GRANULOCYTES NFR BLD AUTO: 0.2 % (ref 0–0.5)
LDLC SERPL CALC-MCNC: 232.6 MG/DL (ref 63–159)
LYMPHOCYTES # BLD AUTO: 1.8 K/UL (ref 1–4.8)
LYMPHOCYTES # BLD AUTO: 1.8 K/UL (ref 1–4.8)
LYMPHOCYTES NFR BLD: 32 % (ref 18–48)
LYMPHOCYTES NFR BLD: 32 % (ref 18–48)
MCH RBC QN AUTO: 31.9 PG (ref 27–31)
MCH RBC QN AUTO: 31.9 PG (ref 27–31)
MCHC RBC AUTO-ENTMCNC: 33.8 G/DL (ref 32–36)
MCHC RBC AUTO-ENTMCNC: 33.8 G/DL (ref 32–36)
MCV RBC AUTO: 94 FL (ref 82–98)
MCV RBC AUTO: 94 FL (ref 82–98)
MONOCYTES # BLD AUTO: 0.5 K/UL (ref 0.3–1)
MONOCYTES # BLD AUTO: 0.5 K/UL (ref 0.3–1)
MONOCYTES NFR BLD: 8.5 % (ref 4–15)
MONOCYTES NFR BLD: 8.5 % (ref 4–15)
NEUTROPHILS # BLD AUTO: 3.2 K/UL (ref 1.8–7.7)
NEUTROPHILS # BLD AUTO: 3.2 K/UL (ref 1.8–7.7)
NEUTROPHILS NFR BLD: 56.8 % (ref 38–73)
NEUTROPHILS NFR BLD: 56.8 % (ref 38–73)
NONHDLC SERPL-MCNC: 254 MG/DL
NRBC BLD-RTO: 0 /100 WBC
NRBC BLD-RTO: 0 /100 WBC
PLATELET # BLD AUTO: 235 K/UL (ref 150–450)
PLATELET # BLD AUTO: 235 K/UL (ref 150–450)
PMV BLD AUTO: 12 FL (ref 9.2–12.9)
PMV BLD AUTO: 12 FL (ref 9.2–12.9)
POTASSIUM SERPL-SCNC: 4.2 MMOL/L (ref 3.5–5.1)
POTASSIUM SERPL-SCNC: 4.2 MMOL/L (ref 3.5–5.1)
PROT SERPL-MCNC: 7.4 G/DL (ref 6–8.4)
PROT SERPL-MCNC: 7.4 G/DL (ref 6–8.4)
RBC # BLD AUTO: 4.27 M/UL (ref 4–5.4)
RBC # BLD AUTO: 4.27 M/UL (ref 4–5.4)
SODIUM SERPL-SCNC: 139 MMOL/L (ref 136–145)
SODIUM SERPL-SCNC: 139 MMOL/L (ref 136–145)
TRIGL SERPL-MCNC: 107 MG/DL (ref 30–150)
TSH SERPL DL<=0.005 MIU/L-ACNC: 0.98 UIU/ML (ref 0.4–4)
WBC # BLD AUTO: 5.66 K/UL (ref 3.9–12.7)
WBC # BLD AUTO: 5.66 K/UL (ref 3.9–12.7)

## 2023-10-20 PROCEDURE — 80061 LIPID PANEL: CPT | Performed by: INTERNAL MEDICINE

## 2023-10-20 PROCEDURE — 86803 HEPATITIS C AB TEST: CPT | Performed by: INTERNAL MEDICINE

## 2023-10-20 PROCEDURE — 85025 COMPLETE CBC W/AUTO DIFF WBC: CPT | Performed by: INTERNAL MEDICINE

## 2023-10-20 PROCEDURE — 36415 COLL VENOUS BLD VENIPUNCTURE: CPT | Performed by: INTERNAL MEDICINE

## 2023-10-20 PROCEDURE — 84443 ASSAY THYROID STIM HORMONE: CPT | Performed by: INTERNAL MEDICINE

## 2023-10-20 PROCEDURE — 87389 HIV-1 AG W/HIV-1&-2 AB AG IA: CPT | Performed by: INTERNAL MEDICINE

## 2023-10-20 PROCEDURE — 80053 COMPREHEN METABOLIC PANEL: CPT | Performed by: INTERNAL MEDICINE

## 2023-10-20 PROCEDURE — 83036 HEMOGLOBIN GLYCOSYLATED A1C: CPT | Performed by: INTERNAL MEDICINE

## 2023-10-27 NOTE — PROGRESS NOTES
Individual Psychotherapy  Date: 9/6/2023  [facility]       Present in Session: Patient  Therapeutic Intervention: Cognitive Behavioral Therapy  Chief complaint/reason for encounter: Anxiety, stress     Interval history and content of current session: Patient reported mild -moderate anxiety symptoms over the past 2 weeks. She noted that mood had improved. She clarified that she continues to experience occasional brief periods of low mood but denied low mood lasting all day. Denied anhedonia. Reviewed sleep hygiene and strategies to improve sleep initiation, including consistent bedtime routine and schedule. Patient explained that she was informed that organ donation is not an option. She stated that this is understandable not nonetheless disappointing. Validated and normalized distress. Reviewed self-care plan (sleep, nutrition, hydration, physical activity, medication adherence, regular social support).    Mental Status Exam & Behavioral Observations  Appearance:  Good grooming and hygiene. Dressed in liset-shirt and athletic pants. Appeared stated age.      Orientation: Oriented x 4.   Speech: Normal rate, volume, and prosody..    Thought Processes: Linear and goal-directed.      Thought Content: Normal. No suicidal or homicidal ideation. No indications of obsessions or delusions.   Mood: Dysphoric  Anxious.   Affect: Full range, congruent with mood and session content..   Attention & Concentration: Intact. No deficits noted.   Insight: Excellent   Judgment: Excellent.   Behavioral Observations: Cooperative and engaged. Seated in chair. Good eye contact. No signs of psychomotor agitation or retardation.     Treatment plan:  Target symptoms: Anxiety  Why chosen therapy is appropriate versus another modality: relevant to diagnosis, patient responds to this modality, evidence based practice  Outcome monitoring methods: self-report, checklist/rating scale  Therapeutic intervention type: behavior modifying  psychotherapy, supportive psychotherapy    Risk parameters:  Patient reports no suicidal ideation  Patient reports no homicidal ideation  Patient reports no self-injurious behavior  Patient reports no violent behavior    Verbal deficits: None    Patient's response to intervention:  The patient's response to intervention is accepting.    Progress toward goals and other mental status changes:  The patient's progress toward goals is good.    Diagnosis:     ICD-10-CM ICD-9-CM   1. Adjustment disorder with anxiety  F43.22 309.24         Plan: Individual psychotherapy; CBT sessions as needed.    Return to clinic: 2 weeks    Length of Service: 56 minutes    Telemedicine Details  The patient location is: [place]  The chief complaint leading to consultation is: Anxiety, depression, coping with caregiver stress    Visit type: audiovisual    Face to Face time with patient: 58 minutes  56 minutes of total time spent on the encounter, which includes face to face time and non-face to face time preparing to see the patient (eg, review of tests), Obtaining and/or reviewing separately obtained history, Documenting clinical information in the electronic or other health record, Independently interpreting results (not separately reported) and communicating results to the patient/family/caregiver, or Care coordination (not separately reported).     Each patient to whom he or she provides medical services by telemedicine is:  (1) informed of the relationship between the physician and patient and the respective role of any other health care provider with respect to management of the patient; and (2) notified that he or she may decline to receive medical services by telemedicine and may withdraw from such care at any time.

## 2023-10-27 NOTE — PROGRESS NOTES
Individual Psychotherapy  Date: 9/13/2023  Site: [facility]       Present in Session: Patient  Therapeutic Intervention: Cognitive Behavioral Therapy  Chief complaint/reason for encounter: Anxiety, stress     Interval history and content of current session: Patient reported mild anxiety and depressive symptoms over the past week. She noted concerns that Zoloft is causing itching as a side effect. Encouraged patient to discuss with prescribing provider. Patient noted increased stress associated with recent gap in RN care for daughter. Night-RN out with COVID. Majority of session focused on goal to increase physical activity. Patient explained that getting to the gym is too difficult at this time. She stated that her revised goal is to take a 20 minutes walk. Elicited description of reasons and abilities related to this goal. Patient described good motivation.    Mental Status Exam & Behavioral Observations  Appearance:  Good grooming and hygiene. Dressed in liset-shirt and athletic pants. Appeared stated age.      Orientation: Oriented x 4.   Speech: Normal rate, volume, and prosody..    Thought Processes: Linear and goal-directed.      Thought Content: Normal. No suicidal or homicidal ideation. No indications of obsessions or delusions.   Mood: Dysphoric  Anxious.   Affect: Full range, congruent with mood and session content..   Attention & Concentration: Intact. No deficits noted.   Insight: Excellent   Judgment: Excellent.   Behavioral Observations: Cooperative and engaged. Seated in chair. Good eye contact. No signs of psychomotor agitation or retardation.     Treatment plan:  Target symptoms: Anxiety  Why chosen therapy is appropriate versus another modality: relevant to diagnosis, patient responds to this modality, evidence based practice  Outcome monitoring methods: self-report, checklist/rating scale  Therapeutic intervention type: behavior modifying psychotherapy, supportive psychotherapy    Risk  parameters:  Patient reports no suicidal ideation  Patient reports no homicidal ideation  Patient reports no self-injurious behavior  Patient reports no violent behavior    Verbal deficits: None    Patient's response to intervention:  The patient's response to intervention is accepting.    Progress toward goals and other mental status changes:  The patient's progress toward goals is good.    Diagnosis:     ICD-10-CM ICD-9-CM   1. Adjustment disorder with anxiety  F43.22 309.24         Plan: Individual psychotherapy; CBT sessions as needed.    Return to clinic: 2 weeks    Length of Service: 50 minutes    Telemedicine Details  The patient location is: [place]  The chief complaint leading to consultation is: Anxiety, depression, coping with caregiver stress    Visit type: audiovisual    Face to Face time with patient: 50 minutes  50 minutes of total time spent on the encounter, which includes face to face time and non-face to face time preparing to see the patient (eg, review of tests), Obtaining and/or reviewing separately obtained history, Documenting clinical information in the electronic or other health record, Independently interpreting results (not separately reported) and communicating results to the patient/family/caregiver, or Care coordination (not separately reported).     Each patient to whom he or she provides medical services by telemedicine is:  (1) informed of the relationship between the physician and patient and the respective role of any other health care provider with respect to management of the patient; and (2) notified that he or she may decline to receive medical services by telemedicine and may withdraw from such care at any time.

## 2023-10-30 ENCOUNTER — PATIENT MESSAGE (OUTPATIENT)
Dept: HEPATOLOGY | Facility: HOSPITAL | Age: 43
End: 2023-10-30
Payer: OTHER GOVERNMENT

## 2023-10-30 NOTE — TELEPHONE ENCOUNTER
Results reviewed.  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  Contacted pt to discuss switching to another SSRI, likely fluoxetine, w/ f/u labs in 4-6 wks.    Lipid profile also w/ high cholesterol & LDL.  Contacted pt to discuss starting HIST.    Orders pending pt agreement

## 2023-11-03 ENCOUNTER — OFFICE VISIT (OUTPATIENT)
Dept: PSYCHIATRY | Facility: CLINIC | Age: 43
End: 2023-11-03
Payer: OTHER GOVERNMENT

## 2023-11-03 DIAGNOSIS — F43.22 ADJUSTMENT DISORDER WITH ANXIETY: Primary | ICD-10-CM

## 2023-11-03 PROCEDURE — 90834 PSYTX W PT 45 MINUTES: CPT | Mod: 95,,, | Performed by: STUDENT IN AN ORGANIZED HEALTH CARE EDUCATION/TRAINING PROGRAM

## 2023-11-03 PROCEDURE — 90834 PR PSYCHOTHERAPY W/PATIENT, 45 MIN: ICD-10-PCS | Mod: 95,,, | Performed by: STUDENT IN AN ORGANIZED HEALTH CARE EDUCATION/TRAINING PROGRAM

## 2023-11-03 NOTE — PROGRESS NOTES
Individual Psychotherapy  Date: 10/5/2023  [facility]       Present in Session: Patient  Therapeutic Intervention: Cognitive Behavioral Therapy  Chief complaint/reason for encounter: Anxiety, stress     Interval history and content of current session: Patient reported mild anxiety and depressive symptoms over the past 2 weeks. She noted continued concerns about Zoloft side effects. She stated that she has decreased Zoloft use and now takes it 3-4 times per week. Patient agreed to discuss concerns with prescribing provider. Patient reported that daughter is doing well - health is stable, appears more interactive. She described a recent family outing to a pumpkin patch. She explained that it took some efforts to organize and execute, but that she found it satisfying to get her daughter out of the house for a fun activity. Patient completed goal of taking a walk. Reviewed self-care plan. Overall, patient appears to be coping well.    Mental Status Exam & Behavioral Observations  Appearance:  Good grooming and hygiene. Dressed in liset-shirt and athletic pants. Appeared stated age.      Orientation: Oriented x 4.   Speech: Normal rate, volume, and prosody..    Thought Processes: Linear and goal-directed.      Thought Content: Normal. No suicidal or homicidal ideation. No indications of obsessions or delusions.   Mood: Dysphoric  Anxious.   Affect: Full range, congruent with mood and session content..   Attention & Concentration: Intact. No deficits noted.   Insight: Excellent   Judgment: Excellent.   Behavioral Observations: Cooperative and engaged. Seated in chair. Good eye contact. No signs of psychomotor agitation or retardation.     Treatment plan:  Target symptoms: Anxiety  Why chosen therapy is appropriate versus another modality: relevant to diagnosis, patient responds to this modality, evidence based practice  Outcome monitoring methods: self-report, checklist/rating scale  Therapeutic intervention  type: behavior modifying psychotherapy, supportive psychotherapy    Risk parameters:  Patient reports no suicidal ideation  Patient reports no homicidal ideation  Patient reports no self-injurious behavior  Patient reports no violent behavior    Verbal deficits: None    Patient's response to intervention:  The patient's response to intervention is accepting.    Progress toward goals and other mental status changes:  The patient's progress toward goals is good.    Diagnosis:     ICD-10-CM ICD-9-CM   1. Adjustment disorder with anxiety  F43.22 309.24       Plan: Individual psychotherapy; CBT sessions as needed.    Return to clinic: 2 weeks    Length of Service: 50 minutes    Telemedicine Details  The patient location is: [place]  The chief complaint leading to consultation is: Anxiety, depression, coping with caregiver stress    Visit type: audiovisual    Face to Face time with patient: 50 minutes  50 minutes of total time spent on the encounter, which includes face to face time and non-face to face time preparing to see the patient (eg, review of tests), Obtaining and/or reviewing separately obtained history, Documenting clinical information in the electronic or other health record, Independently interpreting results (not separately reported) and communicating results to the patient/family/caregiver, or Care coordination (not separately reported).     Each patient to whom he or she provides medical services by telemedicine is:  (1) informed of the relationship between the physician and patient and the respective role of any other health care provider with respect to management of the patient; and (2) notified that he or she may decline to receive medical services by telemedicine and may withdraw from such care at any time.

## 2023-12-28 NOTE — PROGRESS NOTES
Individual Psychotherapy  Date: 11/3/2023  Site: [facility]       Present in Session: Patient  Therapeutic Intervention: Cognitive Behavioral Therapy  Chief complaint/reason for encounter: Anxiety, stress     Interval history and content of current session: Patient reported worsening anxiety and low mood over the past 2 weeks. Session focused on recent conflict with father. Patient described feeling judged unfairly. Also described anxiety associated with belief that she is irresponsible and unethical. Guided patient in process of identifying, challenging, and adjusting dysfunctional beliefs. Patient demonstrated good ability to evaluate thinking using objective evidence. Reviewed self-care plan. Patient was engaged and participated well.    Mental Status Exam & Behavioral Observations  Appearance:  Good grooming and hygiene. Dressed in liset-shirt and athletic pants. Appeared stated age.      Orientation: Oriented x 4.   Speech: Normal rate, volume, and prosody..    Thought Processes: Linear and goal-directed.      Thought Content: Normal. No suicidal or homicidal ideation. No indications of obsessions or delusions.   Mood: Dysphoric  Anxious.   Affect: Full range, congruent with mood and session content..   Attention & Concentration: Intact. No deficits noted.   Insight: Excellent   Judgment: Excellent.   Behavioral Observations: Cooperative and engaged. Seated in chair. Good eye contact. No signs of psychomotor agitation or retardation.     Treatment plan:  Target symptoms: Anxiety  Why chosen therapy is appropriate versus another modality: relevant to diagnosis, patient responds to this modality, evidence based practice  Outcome monitoring methods: self-report, checklist/rating scale  Therapeutic intervention type: behavior modifying psychotherapy, supportive psychotherapy    Risk parameters:  Patient reports no suicidal ideation  Patient reports no homicidal ideation  Patient reports no self-injurious  behavior  Patient reports no violent behavior    Verbal deficits: None    Patient's response to intervention:  The patient's response to intervention is accepting.    Progress toward goals and other mental status changes:  The patient's progress toward goals is good.    Diagnosis:     ICD-10-CM ICD-9-CM   1. Adjustment disorder with anxiety  F43.22 309.24       Plan: Individual psychotherapy; CBT sessions as needed.    Return to clinic: As needed.    Length of Service: 50 minutes    Telemedicine Details  The patient location is: [place]  The chief complaint leading to consultation is: Anxiety, depression, coping with caregiver stress    Visit type: audiovisual    Face to Face time with patient: 50 minutes  50 minutes of total time spent on the encounter, which includes face to face time and non-face to face time preparing to see the patient (eg, review of tests), Obtaining and/or reviewing separately obtained history, Documenting clinical information in the electronic or other health record, Independently interpreting results (not separately reported) and communicating results to the patient/family/caregiver, or Care coordination (not separately reported).     Each patient to whom he or she provides medical services by telemedicine is:  (1) informed of the relationship between the physician and patient and the respective role of any other health care provider with respect to management of the patient; and (2) notified that he or she may decline to receive medical services by telemedicine and may withdraw from such care at any time.

## 2024-03-21 ENCOUNTER — PATIENT MESSAGE (OUTPATIENT)
Dept: PSYCHIATRY | Facility: CLINIC | Age: 44
End: 2024-03-21

## 2024-03-21 ENCOUNTER — OFFICE VISIT (OUTPATIENT)
Dept: PSYCHIATRY | Facility: CLINIC | Age: 44
End: 2024-03-21
Payer: OTHER GOVERNMENT

## 2024-03-21 DIAGNOSIS — F43.23 ADJUSTMENT DISORDER WITH MIXED ANXIETY AND DEPRESSED MOOD: Primary | ICD-10-CM

## 2024-03-21 PROCEDURE — 90834 PSYTX W PT 45 MINUTES: CPT | Mod: 95,,, | Performed by: STUDENT IN AN ORGANIZED HEALTH CARE EDUCATION/TRAINING PROGRAM

## 2024-03-28 PROBLEM — F43.23 ADJUSTMENT DISORDER WITH MIXED ANXIETY AND DEPRESSED MOOD: Status: ACTIVE | Noted: 2023-02-10

## 2024-03-28 NOTE — PROGRESS NOTES
Individual Psychotherapy  Date: 3/21/2024  [facility]       Present in Session: Patient  Therapeutic Intervention: Cognitive Behavioral Therapy  Chief complaint/reason for encounter: Anxiety, stress     Interval history and content of current session: Patient reported worsening anxiety and depressive symptoms over the past several months. She reported regular periods of low mood that often last all day. Endorsed impaired attention, impaired sleep, feelings of worthlessness, decreased energy, and passive suicidal ideation (e.g., occasional wishes for death). She noted that symptoms are worse in the week leading up to her menstrual cycle. Denied any suicidal plans or intent. Patient voiced several reasons to continue living. No history of self-harm or suicide attempt. Social support is excellent. There are no indicators that patient is a risk for self-harm. Patient reported that her daughter is progressing well medically. Patient described concerns about how stress and childcare affect her ability to work. She noted worries about daughter's health and death. Patient described the importance of being with her daughter during her final moments. Worries that will not be present for her daughter death interfere with leaving home without daughter. Reviewed self-care plan. Discussed plan to res-start an antidepressant. Patient will contact PCP and Psychiatry.     Mental Status Exam & Behavioral Observations  Appearance:  Good grooming and hygiene. Dressed in liset-shirt and athletic pants. Appeared stated age.      Orientation: Oriented x 4.   Speech: Normal rate, volume, and prosody..    Thought Processes: Linear and goal-directed.      Thought Content: Normal. No suicidal or homicidal ideation. No indications of obsessions or delusions.   Mood: Dysphoric  Anxious.   Affect: Full range, congruent with mood and session content..   Attention & Concentration: Intact. No deficits noted.   Insight: Excellent    Judgment: Excellent.   Behavioral Observations: Cooperative and engaged. Seated in chair. Good eye contact. No signs of psychomotor agitation or retardation.     Treatment plan:  Target symptoms: Anxiety  Why chosen therapy is appropriate versus another modality: relevant to diagnosis, patient responds to this modality, evidence based practice  Outcome monitoring methods: self-report, checklist/rating scale  Therapeutic intervention type: behavior modifying psychotherapy, supportive psychotherapy    Risk parameters:  Patient reports no suicidal ideation  Patient reports no homicidal ideation  Patient reports no self-injurious behavior  Patient reports no violent behavior    Verbal deficits: None    Patient's response to intervention:  The patient's response to intervention is accepting.    Progress toward goals and other mental status changes:  The patient's progress toward goals is good.    Diagnosis:     ICD-10-CM ICD-9-CM   1. Adjustment disorder with mixed anxiety and depressed mood  F43.23 309.28       Plan: Individual psychotherapy; CBT sessions as needed.    Return to clinic: 2 weeks    Length of Service: 50 minutes    Telemedicine Details  The patient location is: [place]  The chief complaint leading to consultation is: Anxiety, depression, coping with caregiver stress    Visit type: audiovisual    Face to Face time with patient: 50 minutes  50 minutes of total time spent on the encounter, which includes face to face time and non-face to face time preparing to see the patient (eg, review of tests), Obtaining and/or reviewing separately obtained history, Documenting clinical information in the electronic or other health record, Independently interpreting results (not separately reported) and communicating results to the patient/family/caregiver, or Care coordination (not separately reported).     Each patient to whom he or she provides medical services by telemedicine is:  (1) informed of the relationship  between the physician and patient and the respective role of any other health care provider with respect to management of the patient; and (2) notified that he or she may decline to receive medical services by telemedicine and may withdraw from such care at any time.

## 2024-04-03 ENCOUNTER — PATIENT MESSAGE (OUTPATIENT)
Dept: PSYCHIATRY | Facility: CLINIC | Age: 44
End: 2024-04-03
Payer: OTHER GOVERNMENT

## 2024-04-10 ENCOUNTER — PATIENT MESSAGE (OUTPATIENT)
Dept: PSYCHIATRY | Facility: CLINIC | Age: 44
End: 2024-04-10
Payer: OTHER GOVERNMENT

## 2024-04-22 ENCOUNTER — PATIENT MESSAGE (OUTPATIENT)
Dept: PSYCHIATRY | Facility: CLINIC | Age: 44
End: 2024-04-22
Payer: OTHER GOVERNMENT

## 2024-04-24 ENCOUNTER — OFFICE VISIT (OUTPATIENT)
Dept: PSYCHIATRY | Facility: CLINIC | Age: 44
End: 2024-04-24
Payer: OTHER GOVERNMENT

## 2024-04-24 DIAGNOSIS — F43.23 ADJUSTMENT DISORDER WITH MIXED ANXIETY AND DEPRESSED MOOD: Primary | ICD-10-CM

## 2024-04-24 PROCEDURE — 90837 PSYTX W PT 60 MINUTES: CPT | Mod: 95,,, | Performed by: STUDENT IN AN ORGANIZED HEALTH CARE EDUCATION/TRAINING PROGRAM

## 2024-04-30 ENCOUNTER — PATIENT MESSAGE (OUTPATIENT)
Dept: INTERNAL MEDICINE | Facility: CLINIC | Age: 44
End: 2024-04-30
Payer: OTHER GOVERNMENT

## 2024-05-01 ENCOUNTER — PATIENT MESSAGE (OUTPATIENT)
Dept: INTERNAL MEDICINE | Facility: CLINIC | Age: 44
End: 2024-05-01

## 2024-05-01 ENCOUNTER — OFFICE VISIT (OUTPATIENT)
Dept: INTERNAL MEDICINE | Facility: CLINIC | Age: 44
End: 2024-05-01
Payer: OTHER GOVERNMENT

## 2024-05-01 DIAGNOSIS — F43.23 ADJUSTMENT DISORDER WITH MIXED ANXIETY AND DEPRESSED MOOD: Primary | ICD-10-CM

## 2024-05-01 PROCEDURE — 99213 OFFICE O/P EST LOW 20 MIN: CPT | Mod: 95,,, | Performed by: INTERNAL MEDICINE

## 2024-05-01 RX ORDER — CITALOPRAM 20 MG/1
20 TABLET, FILM COATED ORAL DAILY
Qty: 30 TABLET | Refills: 11 | Status: SHIPPED | OUTPATIENT
Start: 2024-05-01 | End: 2025-05-01

## 2024-05-01 NOTE — PROGRESS NOTES
[facility]  Internal Medicine Resident Clinic  Progress/Clinic Note    2024 1:31 PM  Patient ID: Kaity Mancuso 44 y.o. female  : 1980  MRN: [id number]  Primary Care Provider: Ernesto Vazquez MD    Presenting History:     No chief complaint on file.    History of Presenting Illness:   Ms. Kaity Mancuso is a 44 y.o. female who has a past medical history of AMA (advanced maternal age) multigravida 35+.    Pt presents to clinic for f/u of adjustment disorder/anxiety/depression & med change.    She presents as virtual visit for med update in s/o adjustment disorder/anxiety/depression.  Regularly follows w/ Dr Baker in Psychology.  No SI/HI or sxs of psychosis.  Not in acute distress.  Previously intolerant to sertraline 2/2 DILI & pruritus; quickly weaned off.  Now ready to start another med for long-term control of sxs.  Daughter Inocencio is d/c'd from hospital & doing decently well (all things considered) at home w/ family.  Pt is very anxious about time left w/ daughter, but reports handling it okay w/ assistance from family (good social support) & Dr Baker.  Reports father had prior intolerance to fluoxetine in form of andrey/psychosis.    Denies fevers/chills, CP/palpitations, SOB/cough, abdominal pain, nausea/vomiting, constipation/diarrhea, dysuria/hematuria, melena/hematochezia, weakness/numbness/tingling, HA/presyncope/syncope.       Review of Systems   Constitutional:  Positive for activity change. Negative for unexpected weight change.   HENT:  Negative for hearing loss, rhinorrhea and trouble swallowing.    Eyes:  Negative for discharge and visual disturbance.   Respiratory:  Negative for chest tightness and wheezing.    Cardiovascular:  Negative for chest pain and palpitations.   Gastrointestinal:  Positive for constipation. Negative for blood in stool, diarrhea and vomiting.   Endocrine: Negative for polydipsia and polyuria.   Genitourinary:  Negative for difficulty  urinating, dysuria, hematuria and menstrual problem.   Musculoskeletal:  Positive for neck pain. Negative for arthralgias and joint swelling.   Neurological:  Negative for weakness and headaches.   Psychiatric/Behavioral:  Positive for dysphoric mood. Negative for confusion.      Past History:     Past Medical History:   Diagnosis Date    AMA (advanced maternal age) multigravida 35+      Past Surgical History:   Procedure Laterality Date    INGUINAL HERNIA REPAIR Bilateral 1982     Family History    None       Social History     Socioeconomic History    Marital status:    Tobacco Use    Smoking status: Never    Smokeless tobacco: Never   Substance and Sexual Activity    Alcohol use: Not Currently    Drug use: Never    Sexual activity: Yes     Social Determinants of Health     Financial Resource Strain: High Risk (3/21/2024)    Overall Financial Resource Strain (CARDIA)     Difficulty of Paying Living Expenses: Hard   Food Insecurity: Food Insecurity Present (3/21/2024)    Hunger Vital Sign     Worried About Running Out of Food in the Last Year: Sometimes true     Ran Out of Food in the Last Year: Never true   Transportation Needs: No Transportation Needs (3/21/2024)    PRAPARE - Transportation     Lack of Transportation (Medical): No     Lack of Transportation (Non-Medical): No   Physical Activity: Unknown (3/21/2024)    Exercise Vital Sign     Days of Exercise per Week: 0 days   Stress: Stress Concern Present (3/21/2024)    [employer] - Occupational Stress Questionnaire     Feeling of Stress : Very much   Housing Stability: High Risk (3/21/2024)    Housing Stability Vital Sign     Unable to Pay for Housing in the Last Year: Yes     Number of Places Lived in the Last Year: 1     Unstable Housing in the Last Year: No     Review of patient's allergies indicates:   Allergen Reactions    Quinoa Itching, Rash and Swelling    Ciprofloxacin Itching     Other reaction(s): Rash, Vomiting     Penicillins Itching     Other reaction(s): Rash    Zoloft [sertraline] Itching     Current Outpatient Medications   Medication Sig Dispense Refill    citalopram (CELEXA) 20 MG tablet Take 1 tablet (20 mg total) by mouth once daily. 30 tablet 11    omeprazole (PRILOSEC) 40 MG capsule Take 1 capsule (40 mg total) by mouth Daily. 90 capsule 0     No current facility-administered medications for this visit.      Objective:     Vital Signs:   There were no vitals filed for this visit.  There is no height or weight on file to calculate BMI.    Physical Exam  Vitals and nursing note reviewed.   Constitutional:       General: She is not in acute distress.     Appearance: She is not ill-appearing.   HENT:      Head: Normocephalic and atraumatic.      Right Ear: External ear normal.      Left Ear: External ear normal.   Eyes:      General: No scleral icterus.     Extraocular Movements: Extraocular movements intact.      Conjunctiva/sclera: Conjunctivae normal.   Neck:      Vascular: No JVD.   Pulmonary:      Effort: No respiratory distress.   Musculoskeletal:         General: Normal range of motion.      Cervical back: Normal range of motion.   Skin:     General: Skin is warm and dry.      Coloration: Skin is not jaundiced or pale.      Findings: No erythema.   Neurological:      Mental Status: She is alert and oriented to person, place, and time.   Psychiatric:         Mood and Affect: Mood and affect normal.         Judgment: Judgment normal.       Laboratory:   All pertinent labs within the past 24 hours and/or relevant to this visit have been reviewed.    No results found for this or any previous visit (from the past 72 hour(s)).    Diagnostic Studies:     All pertinent imaging/diagnostic studies within the past 24 hours and/or relevant to this visit have been reviewed.    Assessment/Plan:     1. Adjustment disorder with mixed anxiety and depressed mood      Physician Note:   Virtual visit, here for med update.  Stable  adjustment/anxiety/depressive disorder, f/b Dr Baker (Psychology).  No SI/HI.  Previously on sertraline, c/b DILI.  Will trial citalopram.  F/u in 6 wks to assess response/tolerance.  If intolerant, can trial escitalopram vs bupropion vs duloxetine/venlafaxine.  Will draw CMP in 6 wks to ensure no recurrence of DILI & no onset of SSRI-induced hyponatremia (SIADH).    1. Adjustment disorder with mixed anxiety and depressed mood  Overview:  - daughter Inocencio diagnosed w/ life-limiting illness  - currently coping moderately well w/ strong social support  - regularly f/u w/ Psychology Dr Baker  - intolerant to sertraline 2/2 DILI    Assessment & Plan:  - f/u in 6 wks to reassess tolerance & response  - titrate dose accordingly  - check CMP to ensure no recurrence of DILI or SSRI-induced hyponatremia    Orders:  -     citalopram (CELEXA) 20 MG tablet; Take 1 tablet (20 mg total) by mouth once daily.  Dispense: 30 tablet; Refill: 11    Follow up in about 6 weeks (around 6/12/2024), or if symptoms worsen or fail to improve.    No orders of the defined types were placed in this encounter.    Discussed with Dr. ASHLEY Caldera - staff attestation to follow    Ernesto Vazquez MD  Primary Care  Department of Internal Medicine, PGY-2  [facility] Primary [facility]

## 2024-05-01 NOTE — PATIENT INSTRUCTIONS
- start taking citalopram (Celexa) as prescribed.  - go to the ED if you experience any of the following: fever, chills, chest pain, difficulty breathing, abdominal pain, nausea, vomiting, diarrhea, debilitating pain, or worsening symptoms.  - contact me for any other questions or concerns. If I am unavailable, please go to the ED.

## 2024-05-01 NOTE — ASSESSMENT & PLAN NOTE
- f/u in 6 wks to reassess tolerance & response  - titrate dose accordingly  - check CMP to ensure no recurrence of DILI or SSRI-induced hyponatremia

## 2024-05-06 NOTE — PROGRESS NOTES
Individual Psychotherapy  Date: 4/24/2024  Site: [facility]       Present in Session: Patient  Therapeutic Intervention: Cognitive Behavioral Therapy  Chief complaint/reason for encounter: Anxiety, stress     Interval history and content of current session: Patient reported moderate anxiety and depressive symptoms over the past month. She described feeling overwhelmed by care giving-related tasks. Also noted worries about finances and poor work performance. Patient descried sadness associated with decision to sell her house. She explained that she and her  decided to move to small, more affordable house. She described shame associated with beliefs that she is a failure. Guided patient in process of challenging and adjusting dysfunctional beliefs. Reviewed self-care plan. Patient stated that she will follow up with PCP to discuss changing antidepressants. Also noted plans to schedule with Psychiatry.    Mental Status Exam & Behavioral Observations  Appearance:  Good grooming and hygiene. Dressed in liset-shirt and athletic pants. Appeared stated age.      Orientation: Oriented x 4.   Speech: Normal rate, volume, and prosody..    Thought Processes: Linear and goal-directed.      Thought Content: Normal. No suicidal or homicidal ideation. No indications of obsessions or delusions.   Mood: Dysphoric  Anxious.   Affect: Full range, congruent with mood and session content..   Attention & Concentration: Intact. No deficits noted.   Insight: Excellent   Judgment: Excellent.   Behavioral Observations: Cooperative and engaged. Seated in chair. Good eye contact. No signs of psychomotor agitation or retardation.     Treatment plan:  Target symptoms: Anxiety  Why chosen therapy is appropriate versus another modality: relevant to diagnosis, patient responds to this modality, evidence based practice  Outcome monitoring methods: self-report, checklist/rating scale  Therapeutic intervention type: behavior modifying  psychotherapy, supportive psychotherapy    Risk parameters:  Patient reports no suicidal ideation  Patient reports no homicidal ideation  Patient reports no self-injurious behavior  Patient reports no violent behavior    Verbal deficits: None    Patient's response to intervention:  The patient's response to intervention is accepting.    Progress toward goals and other mental status changes:  The patient's progress toward goals is good.    Diagnosis:     ICD-10-CM ICD-9-CM   1. Adjustment disorder with mixed anxiety and depressed mood  F43.23 309.28       Plan: Individual psychotherapy; CBT sessions as needed.    Return to clinic: As needed.    Length of Service: 55 minutes    Telemedicine Details  The patient location is: [place]  The chief complaint leading to consultation is: Anxiety, depression, coping with caregiver stress    Visit type: audiovisual    Face to Face time with patient: 55 minutes  55 minutes of total time spent on the encounter, which includes face to face time and non-face to face time preparing to see the patient (eg, review of tests), Obtaining and/or reviewing separately obtained history, Documenting clinical information in the electronic or other health record, Independently interpreting results (not separately reported) and communicating results to the patient/family/caregiver, or Care coordination (not separately reported).     Each patient to whom he or she provides medical services by telemedicine is:  (1) informed of the relationship between the physician and patient and the respective role of any other health care provider with respect to management of the patient; and (2) notified that he or she may decline to receive medical services by telemedicine and may withdraw from such care at any time.

## 2024-08-27 ENCOUNTER — TELEPHONE (OUTPATIENT)
Dept: PSYCHIATRY | Facility: CLINIC | Age: 44
End: 2024-08-27
Payer: OTHER GOVERNMENT

## 2024-09-24 ENCOUNTER — PATIENT MESSAGE (OUTPATIENT)
Dept: PSYCHIATRY | Facility: CLINIC | Age: 44
End: 2024-09-24
Payer: OTHER GOVERNMENT

## 2024-09-30 ENCOUNTER — OFFICE VISIT (OUTPATIENT)
Dept: PSYCHIATRY | Facility: CLINIC | Age: 44
End: 2024-09-30
Payer: OTHER GOVERNMENT

## 2024-09-30 DIAGNOSIS — F43.23 ADJUSTMENT DISORDER WITH MIXED ANXIETY AND DEPRESSED MOOD: Primary | ICD-10-CM

## 2024-09-30 PROCEDURE — 90837 PSYTX W PT 60 MINUTES: CPT | Mod: 95,,, | Performed by: STUDENT IN AN ORGANIZED HEALTH CARE EDUCATION/TRAINING PROGRAM

## 2024-10-02 NOTE — PROGRESS NOTES
Individual Psychotherapy  Date: 9/30/2024  Site: [facility]       Present in Session: Patient  Therapeutic Intervention: Cognitive Behavioral Therapy  Chief complaint/reason for encounter: Anxiety, stress     Interval history and content of current session: Previous session was 4/24/24. Patient reported moderate anxiety symptoms over the past month. Also noted occasional periods of low mood. Patient described stress associated with care-giver responsibilities, work, strained family relationships. Also reported that she and her family recent moved to smaller house for financial reasons. She stated that the move was difficult, but that her family is starting to feel settled in their new home. Validated and normalized distress. Reviewed values and goals guiding decision making around daughter's care and other related life decisions (work, house). Patient reported that she has not started Celexa. Reviewed pros and cons. She may start Celexa after family [place] trip in October. She has concerns regarding side effects, particularly emotional blunting. Also noted that she is not taking Prilosec. Heart burn interferes with sleep. She wakes 4+ times per night due to heart burn. Patient noted plans to start Prilosec. Patient is aware that this provider will be on leave until January.     Mental Status Exam & Behavioral Observations  Appearance:  Good grooming and hygiene. Dressed in liset-shirt and athletic pants. Appeared stated age.      Orientation: Oriented x 4.   Speech: Normal rate, volume, and prosody..    Thought Processes: Linear and goal-directed.      Thought Content: Normal. No suicidal or homicidal ideation. No indications of obsessions or delusions.   Mood: Dysphoric  Anxious.   Affect: Full range, congruent with mood and session content..   Attention & Concentration: Intact. No deficits noted.   Insight: Excellent   Judgment: Excellent.   Behavioral Observations: Cooperative and engaged. Seated in chair.  Good eye contact. No signs of psychomotor agitation or retardation.     Treatment plan:  Target symptoms: Anxiety  Why chosen therapy is appropriate versus another modality: relevant to diagnosis, patient responds to this modality, evidence based practice  Outcome monitoring methods: self-report, checklist/rating scale  Therapeutic intervention type: behavior modifying psychotherapy, supportive psychotherapy    Risk parameters:  Patient reports no suicidal ideation  Patient reports no homicidal ideation  Patient reports no self-injurious behavior  Patient reports no violent behavior    Verbal deficits: None    Patient's response to intervention:  The patient's response to intervention is accepting.    Progress toward goals and other mental status changes:  The patient's progress toward goals is good.    Diagnosis:     ICD-10-CM ICD-9-CM   1. Adjustment disorder with mixed anxiety and depressed mood  F43.23 309.28         Plan: Individual psychotherapy; CBT sessions as needed.    Return to clinic: As needed.    Length of Service: 60 minutes    Telemedicine Details  The patient location is: [place]  The chief complaint leading to consultation is: Anxiety, depression, coping with caregiver stress    Visit type: audiovisual    Face to Face time with patient: 60 minutes  60 minutes of total time spent on the encounter, which includes face to face time and non-face to face time preparing to see the patient (eg, review of tests), Obtaining and/or reviewing separately obtained history, Documenting clinical information in the electronic or other health record, Independently interpreting results (not separately reported) and communicating results to the patient/family/caregiver, or Care coordination (not separately reported).     Each patient to whom he or she provides medical services by telemedicine is:  (1) informed of the relationship between the physician and patient and the respective role of any other health care  provider with respect to management of the patient; and (2) notified that he or she may decline to receive medical services by telemedicine and may withdraw from such care at any time.

## 2025-01-30 ENCOUNTER — PATIENT MESSAGE (OUTPATIENT)
Dept: PSYCHIATRY | Facility: CLINIC | Age: 45
End: 2025-01-30
Payer: OTHER GOVERNMENT

## 2025-01-30 ENCOUNTER — PATIENT MESSAGE (OUTPATIENT)
Dept: INTERNAL MEDICINE | Facility: CLINIC | Age: 45
End: 2025-01-30
Payer: OTHER GOVERNMENT